# Patient Record
Sex: FEMALE | Race: WHITE | NOT HISPANIC OR LATINO | Employment: FULL TIME | URBAN - METROPOLITAN AREA
[De-identification: names, ages, dates, MRNs, and addresses within clinical notes are randomized per-mention and may not be internally consistent; named-entity substitution may affect disease eponyms.]

---

## 2017-01-03 ENCOUNTER — GENERIC CONVERSION - ENCOUNTER (OUTPATIENT)
Dept: OTHER | Facility: OTHER | Age: 47
End: 2017-01-03

## 2017-01-06 ENCOUNTER — GENERIC CONVERSION - ENCOUNTER (OUTPATIENT)
Dept: OTHER | Facility: OTHER | Age: 47
End: 2017-01-06

## 2017-01-14 ENCOUNTER — HOSPITAL ENCOUNTER (OUTPATIENT)
Dept: MAMMOGRAPHY | Facility: MEDICAL CENTER | Age: 47
Discharge: HOME | End: 2017-01-14
Payer: COMMERCIAL

## 2017-01-14 DIAGNOSIS — Z12.31 ENCOUNTER FOR SCREENING MAMMOGRAM FOR MALIGNANT NEOPLASM OF BREAST: ICD-10-CM

## 2017-01-14 PROCEDURE — 77063 BREAST TOMOSYNTHESIS BI: CPT

## 2017-01-14 PROCEDURE — G0202 SCR MAMMO BI INCL CAD: HCPCS

## 2017-10-20 ENCOUNTER — ALLSCRIPTS OFFICE VISIT (OUTPATIENT)
Dept: OTHER | Facility: OTHER | Age: 47
End: 2017-10-20

## 2017-10-20 DIAGNOSIS — Z12.31 ENCOUNTER FOR SCREENING MAMMOGRAM FOR MALIGNANT NEOPLASM OF BREAST: ICD-10-CM

## 2018-01-15 ENCOUNTER — HOSPITAL ENCOUNTER (OUTPATIENT)
Dept: MAMMOGRAPHY | Facility: MEDICAL CENTER | Age: 48
Discharge: HOME | End: 2018-01-15
Payer: COMMERCIAL

## 2018-01-15 DIAGNOSIS — Z12.31 ENCOUNTER FOR SCREENING MAMMOGRAM FOR MALIGNANT NEOPLASM OF BREAST: ICD-10-CM

## 2018-01-15 PROCEDURE — 77067 SCR MAMMO BI INCL CAD: CPT

## 2018-01-15 PROCEDURE — 77063 BREAST TOMOSYNTHESIS BI: CPT

## 2018-01-15 NOTE — RESULT NOTES
Verified Results  (1) URINALYSIS w URINE C/S REFLEX (will reflex a microscopy if leukocytes, occult blood, or nitrites are not within normal limits) [id number] 06:26PV Ramses Neely     Test Name Result Flag Reference   COLOR Yellow     CLARITY Clear     PH UA 7 5  4 5-8 0   LEUKOCYTE ESTERASE UA Negative  Negative   NITRITE UA Negative  Negative   PROTEIN UA Negative mg/dl  Negative   GLUCOSE UA Negative mg/dl  Negative   KETONES UA Negative mg/dl  Negative   UROBILINOGEN UA 0 2 E U /dl  0 2, 1 0 E U /dl   BILIRUBIN UA Negative  Negative   BLOOD UA Negative  Negative   SPECIFIC GRAVITY UA 1 010  1 003-1 030     (1) CHLAMYDIA/GC AMPLIFIED DNA, PCR [id number] 84:26KE Ramses Neely     Test Name Result Flag Reference   CHLAMYDIA,AMPLIFIED DNA PROBE   C  trachomatis Amplified DNA Negative   C  trachomatis Amplified DNA Negative   For optimal microbe detection, urine samples should be a first catch specimen (20-60 ml of urine)  Patient should not have urinated for at least 1 hour prior to collection  A specimen not collected in this manner may have falsely negative results  N  GONORRHOEAE AMPLIFIED DNA   N  gonorrhoeae Amplified DNA Negative   N  gonorrhoeae Amplified DNA Negative     * US PELVIS COMPLETE [facility] AND TRANSVAGINAL) [id number] 41:75HB Donaldo Mehta Order Number: [id number]    - Patient Instructions: To schedule this appointment, please contact Central Scheduling at [phone]  Test Name Result Flag Reference   US PELVIS COMPLETE (TRANSABDOMINAL AND TRANSVAGINAL) (Report)     PELVIC ULTRASOUND, COMPLETE     INDICATION: Pelvic pain and urinary frequency for 2 months LMP was 8/23/2016      COMPARISON: None  TECHNIQUE:  Transabdominal pelvic ultrasound was performed in sagittal and transverse planes with a curvilinear transducer  Additional transvaginal imaging was performed to better evaluate the endometrium and ovaries   Imaging included volumetric    sweeps as well as traditional still imaging technique  FINDINGS:     UTERUS:   The uterus is anteverted in position, measuring 5 0 x 6 1 x 12 1 cm  The uterus is heterogeneous  The cervix shows no suspicious abnormality  ENDOMETRIUM:    Normal caliber of 8 mm  Homogenous and normal in appearance  OVARIES/ADNEXA:   Right ovary: 1 5 x 1 3 x 2 5 cm  No suspicious right ovarian abnormality  Doppler flow within normal limits  Left ovary: 1 8 x 3 3 x 4 4 cm  There is a 2 cm cyst in the left ovary with increased echogenicity along the wall, an indeterminate feature  No internal vascularity is identified  Doppler flow within normal limits  No suspicious adnexal mass or loculated collections  There is no free fluid  IMPRESSION:      1  Heterogeneous uterus  This is nonspecific though can be seen with adenomyosis  2  Complex left ovarian cyst with indeterminate features though no internal vascularity  A follow-up ultrasound in 6-12 weeks is recommended to ensure resolution        ##sigslh##sigslh     ##fuslh3##fuslh3       Workstation performed: [id number]     Signed by:   Martha Holland MD   9/6/16

## 2018-01-15 NOTE — RESULT NOTES
Verified Results  * [facility] [id number] 49:85ZF Chio Manriquez Order Number: [id number]   Performing Comments: This is a follow up US to recheck left ovarain cyst   - Patient Instructions: To schedule this appointment, please contact Central Scheduling at [phone]  Test Name Result Flag Reference   US PELVIS COMPLETE (TRANSABDOMINAL AND TRANSVAGINAL) (Report)     PELVIC ULTRASOUND, COMPLETE     INDICATION: Follow-up ovarian cyst           COMPARISON: Pelvic ultrasound 9/2/2016     TECHNIQUE:  Transabdominal pelvic ultrasound was performed in sagittal and transverse planes with a curvilinear transducer  Additional transvaginal imaging was performed to better evaluate the endometrium and ovaries  Imaging included volumetric    sweeps as well as traditional still imaging technique  FINDINGS:     UTERUS:   The uterus is anteverted in position, measuring 9 4 x 4 5 x 5 4 cm  Contour appears normal  1 4 x 0 9 x 1 1 cm posterior fundal fibroid  The cervix shows no suspicious abnormality  ENDOMETRIUM:    Normal caliber of 4 mm  Homogenous and normal in appearance  OVARIES/ADNEXA:   Right ovary: 2 6 x 0 8 x 1 2 cm  No suspicious right ovarian abnormality  Doppler flow within normal limits  Left ovary: 2 3 x 2 1 x 2 0 cm  No suspicious left ovarian abnormality  Complex cyst seen previously has resolved  There is a 1 3 x 1 1 x 1 2 cm simple appearing follicular cyst    Doppler flow within normal limits  No suspicious adnexal mass or loculated collections  There is no free fluid  IMPRESSION:      Resolution of complex left ovarian cyst  1 3 cm simple appearing left ovarian follicle  Small fundal fibroid            Workstation performed: [id number]     Signed by:   mAy Lucero DO   12/30/16

## 2018-01-18 NOTE — RESULT NOTES
Verified Results  (1) THIN PREP PAP WITH IMAGING [id number] 12:76ZW Liz Hirsch Order Number: [id number]     Test Name Result Flag Reference   LAB AP CASE REPORT (Report)     Gynecologic Cytology Report            Case: [id number]                  Authorizing Provider: JAIME Saavedra    Collected:      09/29/2016 1556        First Screen:     JEANNE Talamantes    Received:      10/03/2016 1007        Specimen:  LIQUID-BASED PAP, SCREENING, Cervix, Endocervical   HPV HIGH RISK RESULT (Report)     HPV, High Risk: HPV NEG, HPV16 NEG, HPV18 NEG      Other High Risk HPV Negative, HPV 16 Negative, HPV 18 Negative  HPV types: 16,18,31,33,35,39,45,51,52,56,58,59,66 and 68 DNA are undetectable or below the pre-set threshold  Roche?s FDA approved Whit 4800 is utilized with strict adherence to the ?s instruction  manual to test for the presence of High-Risk HPV DNA, as well as HPV 16 and HPV 18  This instrument  has been validated by our laboratory and/or by the   A negative result does not preclude the presence of HPV infection because results depend on adequate  specimen collection, absence of inhibitors and sufficient DNA to be detected  Additionally, HPV negative  results are not intended to prevent women from proceeding to colposcopy if clinically warranted  Positive HPV test results indicate the presence of any one or more of the high risk types, but since patients  are often co-infected with low-risk types it does not rule out the presence of low-risk types in patients  with mixed infections  LAB AP GYN PRIMARY INTERPRETATION      Negative for intraepithelial lesion or malignancy  Electronically signed by JEANNE Talamantes on 10/5/2016 at 3:00 PM   LAB AP GYN SPECIMEN ADEQUACY      Satisfactory for evaluation  Endocervical/transformation zone component present     LAB AP GYN ADDITIONAL INFORMATION (Report)     Hologic's FDA approved ,  and ThinPrep Imaging System are   utilized with strict adherence to the 's instruction manual to   prepare gynecologic and non-gynecologic cytology specimens for the   production of ThinPrep slides as well as for gynecologic ThinPrep imaging  These processes have been validated by our laboratory and/or by the     The Pap test is not a diagnostic procedure and should not be used as the   sole means to detect cervical cancer  It is only a screening procedure to   aid in the detection of cervical cancer and its precursors  Both   false-negative and false-positive results have been experienced  Your   patient's test result should be interpreted in this context together with   the history and clinical findings  LAB AP LMP 9/17/2016     Performing Comments: CERVICAL - ENDOCERVICAL   ROUTINE PAP TEST  CO-TEST REGARDLESS, TYPE 16 & 18

## 2018-01-22 VITALS
WEIGHT: 140 LBS | SYSTOLIC BLOOD PRESSURE: 116 MMHG | BODY MASS INDEX: 22.5 KG/M2 | DIASTOLIC BLOOD PRESSURE: 70 MMHG | HEIGHT: 66 IN

## 2018-09-25 ENCOUNTER — DOCUMENTATION (OUTPATIENT)
Dept: GYNECOLOGY | Facility: CLINIC | Age: 48
End: 2018-09-25

## 2018-09-25 NOTE — PROGRESS NOTES
Pt called got her period 9/2 it was light lasted 1 day then stopped got period again on 9/7 bleeding since then using 1-2 tampons daily not heavy no cramps    Spoke to Dr Carson Mensah he advised TVS/SIS appt made 10/17

## 2018-10-30 ENCOUNTER — ANNUAL EXAM (OUTPATIENT)
Dept: GYNECOLOGY | Facility: CLINIC | Age: 48
End: 2018-10-30
Payer: COMMERCIAL

## 2018-10-30 VITALS
WEIGHT: 141.4 LBS | DIASTOLIC BLOOD PRESSURE: 70 MMHG | BODY MASS INDEX: 22.73 KG/M2 | SYSTOLIC BLOOD PRESSURE: 112 MMHG | HEIGHT: 66 IN

## 2018-10-30 DIAGNOSIS — Z12.31 ENCOUNTER FOR SCREENING MAMMOGRAM FOR MALIGNANT NEOPLASM OF BREAST: ICD-10-CM

## 2018-10-30 DIAGNOSIS — Z12.4 ENCOUNTER FOR PAPANICOLAOU SMEAR FOR CERVICAL CANCER SCREENING: ICD-10-CM

## 2018-10-30 DIAGNOSIS — Z01.419 ENCOUNTER FOR ROUTINE GYNECOLOGICAL EXAMINATION WITH PAPANICOLAOU SMEAR OF CERVIX: Primary | ICD-10-CM

## 2018-10-30 PROCEDURE — G0145 SCR C/V CYTO,THINLAYER,RESCR: HCPCS | Performed by: NURSE PRACTITIONER

## 2018-10-30 PROCEDURE — S0612 ANNUAL GYNECOLOGICAL EXAMINA: HCPCS | Performed by: NURSE PRACTITIONER

## 2018-10-30 RX ORDER — ALBUTEROL SULFATE 90 UG/1
AEROSOL, METERED RESPIRATORY (INHALATION)
COMMUNITY
Start: 2010-02-09 | End: 2020-03-30 | Stop reason: SDUPTHER

## 2018-10-30 RX ORDER — CETIRIZINE HYDROCHLORIDE 10 MG/1
10 TABLET ORAL
COMMUNITY
Start: 2011-05-27

## 2018-10-30 RX ORDER — BUDESONIDE AND FORMOTEROL FUMARATE DIHYDRATE 160; 4.5 UG/1; UG/1
AEROSOL RESPIRATORY (INHALATION)
COMMUNITY
End: 2020-03-30 | Stop reason: SDUPTHER

## 2018-10-30 RX ORDER — LORATADINE 10 MG/1
CAPSULE, LIQUID FILLED ORAL AS NEEDED
COMMUNITY

## 2018-10-30 RX ORDER — OLOPATADINE HYDROCHLORIDE 2 MG/ML
SOLUTION/ DROPS OPHTHALMIC
COMMUNITY
Start: 2018-10-03 | End: 2021-06-24 | Stop reason: SDUPTHER

## 2018-10-30 NOTE — PROGRESS NOTES
Subjective      Chilango Mcgee is a 49 y o  female who presents for her annual exam  Periods are regular every 28-30 days, lasting 5 days  Dysmenorrhea:none  Cyclic symptoms include none  No intermenstrual bleeding, spotting, or discharge  She relates that she had one episode of irregular bleeding in September which did last longer than usual  She is scheduled for further evaluation but prefers to observe at this time  Current contraception: vasectomy  History of abnormal Pap smear: no  Family history of breast cancer: yes - M aunt ( after age 48)  Past Medical History:   Diagnosis Date    Female pelvic pain      Family History   Problem Relation Age of Onset    Breast cancer Maternal Aunt      Past Surgical History:   Procedure Laterality Date    COLONOSCOPY       Social History     Social History    Marital status: /Civil Union     Spouse name: N/A    Number of children: N/A    Years of education: N/A     Occupational History    Not on file       Social History Main Topics    Smoking status: Current Some Day Smoker    Smokeless tobacco: Never Used    Alcohol use Yes      Comment: 1-2 drinks per week    Drug use: No    Sexual activity: Yes     Partners: Male     Birth control/ protection: Male Sterilization     Other Topics Concern    Not on file     Social History Narrative    No narrative on file       Current Outpatient Prescriptions:     albuterol (PROVENTIL HFA,VENTOLIN HFA) 90 mcg/act inhaler, 2 inhalations by mouth every 4-6 hours prn wheezing , Disp: , Rfl:     Azelastine HCl 0 15 % SOLN, 0 15 % into each nostril, Disp: , Rfl:     cetirizine (ZyrTEC) 10 mg tablet, Take 10 mg by mouth every 24 hours, Disp: , Rfl:     Loratadine 10 MG CAPS, Take by mouth, Disp: , Rfl:     budesonide-formoterol (SYMBICORT) 160-4 5 mcg/act inhaler, Inhale, Disp: , Rfl:     olopatadine HCl (PATADAY) 0 2 % opth drops, , Disp: , Rfl:       Review of Systems  Constitutional :no fever, feels well, no tiredness, no recent weight gain or loss  Cardiovascular: no complaints of slow or fast heart beat, no chest pain, no palpitations  Respiratory: no complaints of shortness of shortness of breath, no SINGLETON  Breasts:no complaints of breast pain, breast lump, or nipple discharge  Gastrointestinal: no complaints of abdominal pain, constipation,nausea, vomiting, or diarrhea or bloody stools  Genitourinary : no complaints of dysuria, incontinence, pelvic pain, dysmenorrhea,vaginal discharge or abnormal vaginal bleeding  Integumentary: no complaints of skin rash or lesion,itching or dry skin  Neurological: no complaints of headache,numbness, tingling, dizziness or fainting       Objective      /70 (BP Location: Right arm)   Ht 5' 5 5" (1 664 m)   Wt 64 1 kg (141 lb 6 4 oz)   LMP 10/17/2018   BMI 23 17 kg/m²     General:   appears stated age","cooperative","alert" normal mood and affect   Neck: Neck: normal, supple,trachea midline, no masses   Heart: regular rate and rhythm, S1, S2 normal, no murmur, click, rub or gallop   Lungs: clear to auscultation bilaterally   Breasts: Breast exam :normal, no dimpling or skin changes noted   Abdomen: soft, non-tender, without masses or organomegaly   Vulva: Normal , no lesions   Vagina: normal , no lesions or dryness   Urethra: normal   Cervix: Normal, no palpable masses A pap smear with reflex was done/pt  request   Uterus: Normal , non-tender,not enlarged,no palpable masses   Adnexa: Normal, non-tender without fullness or masses                         Assessment     Normal GYN exam       Plan      All questions answered  Await pap smear results  Breast self exam technique reviewed and patient encouraged to perform self-exam monthly  Dietary diary  Follow up as needed  Mammogram   Thin prep Pap smear  We discussed her longer than usual menses in September and possible causes  Since her Oct  menses was normal she prefers to observe at this time   Should this recur she [facility] and follow through with a work up  She did have a normal colonoscopy done this year and will be due for a repeat in 5 yrs

## 2018-11-02 LAB
LAB AP GYN PRIMARY INTERPRETATION: NORMAL
Lab: NORMAL

## 2019-01-18 ENCOUNTER — HOSPITAL ENCOUNTER (OUTPATIENT)
Dept: MAMMOGRAPHY | Facility: MEDICAL CENTER | Age: 49
Discharge: HOME | End: 2019-01-18
Payer: COMMERCIAL

## 2019-01-18 VITALS — BODY MASS INDEX: 21.69 KG/M2 | HEIGHT: 66 IN | WEIGHT: 135 LBS

## 2019-01-18 DIAGNOSIS — Z12.31 ENCOUNTER FOR SCREENING MAMMOGRAM FOR MALIGNANT NEOPLASM OF BREAST: ICD-10-CM

## 2019-01-18 PROCEDURE — 77067 SCR MAMMO BI INCL CAD: CPT

## 2019-01-18 PROCEDURE — 77063 BREAST TOMOSYNTHESIS BI: CPT

## 2019-04-02 ENCOUNTER — CONSULT (OUTPATIENT)
Dept: SURGICAL ONCOLOGY | Facility: CLINIC | Age: 49
End: 2019-04-02
Payer: COMMERCIAL

## 2019-04-02 VITALS
SYSTOLIC BLOOD PRESSURE: 100 MMHG | BODY MASS INDEX: 23.4 KG/M2 | TEMPERATURE: 98 F | HEIGHT: 66 IN | WEIGHT: 145.6 LBS | DIASTOLIC BLOOD PRESSURE: 60 MMHG | HEART RATE: 74 BPM | RESPIRATION RATE: 14 BRPM

## 2019-04-02 DIAGNOSIS — Z91.89 INCREASED RISK OF BREAST CANCER: ICD-10-CM

## 2019-04-02 DIAGNOSIS — R92.2 DENSE BREAST TISSUE: Primary | ICD-10-CM

## 2019-04-02 DIAGNOSIS — Z12.39 BREAST CANCER SCREENING OTHER THAN MAMMOGRAM: ICD-10-CM

## 2019-04-02 DIAGNOSIS — Z80.3 FAMILY HISTORY OF BREAST CANCER: ICD-10-CM

## 2019-04-02 DIAGNOSIS — Z12.31 SCREENING MAMMOGRAM FOR HIGH-RISK PATIENT: ICD-10-CM

## 2019-04-02 PROBLEM — R92.30 DENSE BREAST TISSUE: Status: ACTIVE | Noted: 2019-04-02

## 2019-04-02 PROCEDURE — 99243 OFF/OP CNSLTJ NEW/EST LOW 30: CPT | Performed by: SURGERY

## 2019-04-02 RX ORDER — SERTRALINE HYDROCHLORIDE 25 MG/1
25 TABLET, FILM COATED ORAL
COMMUNITY
Start: 2019-03-29 | End: 2021-11-04 | Stop reason: ALTCHOICE

## 2019-04-02 RX ORDER — ALPRAZOLAM 0.25 MG/1
TABLET ORAL
COMMUNITY
Start: 2019-01-31

## 2019-04-12 ENCOUNTER — DOCUMENTATION (OUTPATIENT)
Dept: HEMATOLOGY ONCOLOGY | Facility: CLINIC | Age: 49
End: 2019-04-12

## 2019-06-10 ENCOUNTER — DOCUMENTATION (OUTPATIENT)
Dept: GYNECOLOGY | Facility: CLINIC | Age: 49
End: 2019-06-10

## 2019-07-10 ENCOUNTER — ULTRASOUND (OUTPATIENT)
Dept: GYNECOLOGY | Facility: CLINIC | Age: 49
End: 2019-07-10
Payer: COMMERCIAL

## 2019-07-10 ENCOUNTER — OFFICE VISIT (OUTPATIENT)
Dept: GYNECOLOGY | Facility: CLINIC | Age: 49
End: 2019-07-10
Payer: COMMERCIAL

## 2019-07-10 DIAGNOSIS — N93.9 ABNORMAL UTERINE BLEEDING (AUB): Primary | ICD-10-CM

## 2019-07-10 PROCEDURE — 58340 CATHETER FOR HYSTEROGRAPHY: CPT | Performed by: OBSTETRICS & GYNECOLOGY

## 2019-07-10 PROCEDURE — 76830 TRANSVAGINAL US NON-OB: CPT | Performed by: OBSTETRICS & GYNECOLOGY

## 2019-07-10 PROCEDURE — 88305 TISSUE EXAM BY PATHOLOGIST: CPT | Performed by: PATHOLOGY

## 2019-07-10 PROCEDURE — 76831 ECHO EXAM UTERUS: CPT | Performed by: OBSTETRICS & GYNECOLOGY

## 2019-07-10 PROCEDURE — 58100 BIOPSY OF UTERUS LINING: CPT | Performed by: OBSTETRICS & GYNECOLOGY

## 2019-07-10 RX ORDER — MEDROXYPROGESTERONE ACETATE 10 MG/1
10 TABLET ORAL DAILY
Qty: 10 TABLET | Refills: 0 | Status: SHIPPED | OUTPATIENT
Start: 2019-07-10 | End: 2021-11-04 | Stop reason: ALTCHOICE

## 2019-07-10 NOTE — PROGRESS NOTES
Patient presents to the office today for transvaginal scan possible endometrial biopsy possible SIS secondary to abnormal uterine bleeding her menses but have been fairly regular up until the past month or 2  She has been having episodes of bleeding every week to 2  TVS/SIS        Abnormal uterine bleeding (AUB) [N93 9]     Show:Clear all  [x]Manual[x]Template[]Copied    Added by:  [x]Lucia Zheng    []López for details  AMB US Pelvic Non OB  Date/Time: 7/10/2019 10:54 AM  Performed by: Micah Ren  Authorized by: Annamaria Brower DO      Procedure details:     Indications: non-obstetric vaginal bleeding      Technique:  Transvaginal US, Non-OB    Position: lithotomy exam    Uterine findings:     Length (cm): 9 58    Height (cm):  5 3    Width (cm):  6 46    Endometrial stripe: identified      Endometrium thickness (mm):  15 4  Left ovary findings:     Left ovary:  Visualized    Length (cm): 3 94    Height (cm): 1 58    Width (cm): 2 19  Right ovary findings:     Right ovary:  Visualized    Length (cm): 4 03    Height (cm): 2 39    Width (cm): 3 24  Other findings:     Free pelvic fluid: identified    Post-Procedure Details:     Impression:  Anteverted uterus demonstrates multiple small fibroids  Largest are posterior subserosal 2 0cm, anterior intramural 1 3cm, and posterior intramural 1 1cm  The bilateral ovaries appear within normal limits  The right ovary contains a 9 2FR follicle  There is a small amount of free fluid noted within the cul de sac  Tolerance:   Tolerated well, no immediate complications    Complications: no complications    Additional Procedure Comments:      [employer] P5 transvaginal transducer E8C with Serial Number [id number] was used during procedure and subsequently cleaned with high level disinfection utilizing the [employer]       Patient's partner has had a vasectomy as her form of birth control       Compare to 12/30/2016       Ultrasound performed at:    [facility] [address]  [address]  [address] [place] [address] Rd, [address]  Phone: [phone]  Fax:  [phone]     Sonohysterogram  Date/Time: 7/10/2019 10:57 AM  Performed by: Kim Mtz DO  Authorized by: Kim Mtz DO      Consent:     Consent obtained:  Verbal and written    Consent given by:  Patient    Patient questions answered: yes      Patient agrees, verbalizes understanding, and wants to proceed: yes      Instructions and paperwork completed: yes    Pre-procedure:     Pre-procedure timeout performed: yes      Prepped with: Betadine    Procedure:     Cervix cleaned and prepped: yes      Catheter inserted: yes      Uterine cavity distended with saline: yes    Post-procedure:     Patient observed: yes      Post procedure instructions given to patient: yes      Patient tolerated procedure well with no complications: yes    Comments:      Sonohysterogram demonstrates a smooth appearing endometrium  Endometrial biopsy  Date/Time: 7/10/2019 10:58 AM  Performed by: Kim Mtz DO  Authorized by: Kim Mtz DO      Consent:     Consent obtained:  Verbal and written    Consent given by:  Patient    Patient questions answered: yes      Patient agrees, verbalizes understanding, and wants to proceed: yes      Instructions and paperwork completed: yes    Indication:     Indications: Other disorder of menstruation and other abnormal bleeding from female genital tract    Pre-procedure:     Pre-procedure timeout performed: yes    Procedure:     Procedure: endometrial biopsy with Pipelle      A bivalve speculum was placed in the vagina: yes      Cervix cleaned and prepped: yes      Specimen collected: specimen collected and sent to pathology      Patient tolerated procedure well with no complications: yes             Findings reviewed with the patient  Plan is Provera 10 mg daily for 10 days    If AUB persists she will return to the office

## 2019-07-10 NOTE — PROGRESS NOTES
AMB US Pelvic Non OB  Date/Time: 7/10/2019 10:54 AM  Performed by: Seth Sanchez  Authorized by: Angelo Baltazar DO     Procedure details:     Indications: non-obstetric vaginal bleeding      Technique:  Transvaginal US, Non-OB    Position: lithotomy exam    Uterine findings:     Length (cm): 9 58    Height (cm):  5 3    Width (cm):  6 46    Endometrial stripe: identified      Endometrium thickness (mm):  15 4  Left ovary findings:     Left ovary:  Visualized    Length (cm): 3 94    Height (cm): 1 58    Width (cm): 2 19  Right ovary findings:     Right ovary:  Visualized    Length (cm): 4 03    Height (cm): 2 39    Width (cm): 3 24  Other findings:     Free pelvic fluid: identified    Post-Procedure Details:     Impression:  Anteverted uterus demonstrates multiple small fibroids  Largest are posterior subserosal 2 0cm, anterior intramural 1 3cm, and posterior intramural 1 1cm  The bilateral ovaries appear within normal limits  The right ovary contains a 5 9EG follicle  There is a small amount of free fluid noted within the cul de sac  Tolerance: Tolerated well, no immediate complications    Complications: no complications    Additional Procedure Comments:      [employer] P5 transvaginal transducer E8C with Serial Number [id number] was used during procedure and subsequently cleaned with high level disinfection utilizing the [employer]  Patient's partner has had a vasectomy as her form of birth control  Compare to 12/30/2016       Ultrasound performed at:     [address] for [facility] [address]  [address]  [address] [facility]  Phone: [phone]  Fax:  [phone]    Sonohysterogram  Date/Time: 7/10/2019 10:57 AM  Performed by: Angelo Baltazar DO  Authorized by: Angelo Baltazar DO     Consent:     Consent obtained:  Verbal and written    Consent given by:  Patient    Patient questions answered: yes      Patient agrees, verbalizes understanding, and wants to proceed: yes      Instructions and paperwork completed: yes    Pre-procedure:     Pre-procedure timeout performed: yes      Prepped with: Betadine    Procedure:     Cervix cleaned and prepped: yes      Catheter inserted: yes      Uterine cavity distended with saline: yes    Post-procedure:     Patient observed: yes      Post procedure instructions given to patient: yes      Patient tolerated procedure well with no complications: yes    Comments:      Sonohysterogram demonstrates a smooth appearing endometrium  Endometrial biopsy  Date/Time: 7/10/2019 10:58 AM  Performed by: Marlen Buchanan DO  Authorized by: Marlen Buchanan DO     Consent:     Consent obtained:  Verbal and written    Consent given by:  Patient    Patient questions answered: yes      Patient agrees, verbalizes understanding, and wants to proceed: yes      Instructions and paperwork completed: yes    Indication:     Indications:  Other disorder of menstruation and other abnormal bleeding from female genital tract    Pre-procedure:     Pre-procedure timeout performed: yes    Procedure:     Procedure: endometrial biopsy with Pipelle      A bivalve speculum was placed in the vagina: yes      Cervix cleaned and prepped: yes      Specimen collected: specimen collected and sent to pathology      Patient tolerated procedure well with no complications: yes

## 2019-07-15 ENCOUNTER — TELEPHONE (OUTPATIENT)
Dept: GYNECOLOGY | Facility: CLINIC | Age: 49
End: 2019-07-15

## 2019-07-15 DIAGNOSIS — N76.0 VAGINITIS AND VULVOVAGINITIS: Primary | ICD-10-CM

## 2019-07-15 RX ORDER — FLUCONAZOLE 150 MG/1
150 TABLET ORAL ONCE
Qty: 1 TABLET | Refills: 0 | Status: SHIPPED | OUTPATIENT
Start: 2019-07-15 | End: 2019-07-15

## 2019-07-15 NOTE — TELEPHONE ENCOUNTER
Patient called and is getting a yeast infection, she suspects, from her ultra sound  She would like the pill for this (Diflucan??) sent to [facility]

## 2019-07-19 ENCOUNTER — HOSPITAL ENCOUNTER (OUTPATIENT)
Dept: ULTRASOUND IMAGING | Facility: CLINIC | Age: 49
Discharge: HOME | End: 2019-07-19
Payer: COMMERCIAL

## 2019-07-19 VITALS — BODY MASS INDEX: 22.5 KG/M2 | HEIGHT: 66 IN | WEIGHT: 140 LBS

## 2019-07-19 DIAGNOSIS — R92.2 DENSE BREAST TISSUE: ICD-10-CM

## 2019-07-19 DIAGNOSIS — Z12.39 BREAST CANCER SCREENING OTHER THAN MAMMOGRAM: ICD-10-CM

## 2019-07-19 PROCEDURE — 76641 ULTRASOUND BREAST COMPLETE: CPT

## 2019-07-19 PROCEDURE — 76377 3D RENDER W/INTRP POSTPROCES: CPT

## 2019-10-03 DIAGNOSIS — Z23 NEED FOR INFLUENZA VACCINATION: Primary | ICD-10-CM

## 2019-10-08 ENCOUNTER — OFFICE VISIT (OUTPATIENT)
Dept: URGENT CARE | Facility: CLINIC | Age: 49
End: 2019-10-08

## 2019-10-08 VITALS
TEMPERATURE: 95.9 F | RESPIRATION RATE: 18 BRPM | OXYGEN SATURATION: 98 % | HEART RATE: 73 BPM | DIASTOLIC BLOOD PRESSURE: 68 MMHG | SYSTOLIC BLOOD PRESSURE: 104 MMHG

## 2019-10-08 DIAGNOSIS — R05.9 COUGH: ICD-10-CM

## 2019-10-08 DIAGNOSIS — J01.00 ACUTE NON-RECURRENT MAXILLARY SINUSITIS: Primary | ICD-10-CM

## 2019-10-08 RX ORDER — AZITHROMYCIN 250 MG/1
TABLET, FILM COATED ORAL
Qty: 6 TABLET | Refills: 0 | Status: SHIPPED | OUTPATIENT
Start: 2019-10-08 | End: 2019-10-12

## 2019-10-08 RX ORDER — BROMPHENIRAMINE MALEATE, PSEUDOEPHEDRINE HYDROCHLORIDE, AND DEXTROMETHORPHAN HYDROBROMIDE 2; 30; 10 MG/5ML; MG/5ML; MG/5ML
10 SYRUP ORAL 4 TIMES DAILY PRN
Qty: 120 ML | Refills: 0 | Status: SHIPPED | OUTPATIENT
Start: 2019-10-08 | End: 2021-11-04 | Stop reason: ALTCHOICE

## 2019-10-08 NOTE — PROGRESS NOTES
[facility]        NAME: Alida Nunes is a 51 y o  female  : 1970    MRN: [id number]  DATE: 2019  TIME: 12:27 PM    Assessment and Plan   Acute non-recurrent maxillary sinusitis [J01 00]  1  Acute non-recurrent maxillary sinusitis  azithromycin (ZITHROMAX) 250 mg tablet   2  Cough  brompheniramine-pseudoephedrine-DM 30-2-10 MG/5ML syrup         Patient Instructions   Discussed with patient azithromycin is not ideal for sinusitis though patient persistent this is the only antibiotic which works for her  May alternate Tylenol and Ibuprofen as needed  Encourage fluids and rest    Saline nasal spray as needed  Humidify bedroom  Salt water gargles  Chloraseptic spray and lozenges as needed  Consider adding anti-histamines daily, ie  Claritin or Zyrtec  Complete course of antibiotics as directed  Follow up with PCP in 3-5 days  Proceed to  ER if symptoms worsen  Chief Complaint     Chief Complaint   Patient presents with    Cough         History of Present Illness       Patient presents in office with headache, eye pain, sore throat, bilateral ear pain, cough x  4 days  Today with productive cough  Felt febrile  Denies chills, body aches, NV/D  +asthma, has been using inhaler  Former smoker  + seasonal allergies, takes claritin daily and nasal spray prn  Saline rinse daily  No additional otc meds used  Review of Systems   Review of Systems   Constitutional: Positive for fatigue and fever  Negative for chills  HENT: Positive for congestion, ear pain, sinus pressure, sinus pain and sore throat  Negative for rhinorrhea  Respiratory: Positive for cough and chest tightness  Negative for shortness of breath and wheezing  Cardiovascular: Negative  Gastrointestinal: Negative  Musculoskeletal: Negative for myalgias  Skin: Negative for rash           Current Medications       Current Outpatient Medications:     albuterol (PROVENTIL HFA,VENTOLIN HFA) 90 mcg/act inhaler, 2 inhalations by mouth every 4-6 hours prn wheezing , Disp: , Rfl:     budesonide-formoterol (SYMBICORT) 160-4 5 mcg/act inhaler, Inhale, Disp: , Rfl:     Loratadine 10 MG CAPS, Take by mouth, Disp: , Rfl:     olopatadine HCl (PATADAY) 0 2 % opth drops, , Disp: , Rfl:     sertraline (ZOLOFT) 25 mg tablet, Take 25 mg by mouth, Disp: , Rfl:     ALPRAZolam (XANAX) 0 25 mg tablet, Take 1 tab by mouth daily as needed for anxiety, Disp: , Rfl:     Azelastine HCl 0 15 % SOLN, 0 15 % into each nostril, Disp: , Rfl:     azithromycin (ZITHROMAX) 250 mg tablet, Take 2 tablets on day one, then 1 tablet on day 2-5 , Disp: 6 tablet, Rfl: 0    brompheniramine-pseudoephedrine-DM 30-2-10 MG/5ML syrup, Take 10 mL by mouth 4 (four) times a day as needed for cough, Disp: 120 mL, Rfl: 0    cetirizine (ZyrTEC) 10 mg tablet, Take 10 mg by mouth every 24 hours, Disp: , Rfl:     medroxyPROGESTERone (PROVERA) 10 mg tablet, Take 1 tablet (10 mg total) by mouth daily for 10 days, Disp: 10 tablet, Rfl: 0    Current Allergies     Allergies as of 10/08/2019 - Reviewed 10/08/2019   Allergen Reaction Noted    Clarithromycin  09/10/2012    Methylprednisolone      Penicillins  09/10/2012    Prednisone  04/13/2015    Sulfa antibiotics  04/13/2015            The following portions of the patient's history were reviewed and updated as appropriate: allergies, current medications, past family history, past medical history, past social history, past surgical history and problem list      Past Medical History:   Diagnosis Date    Asthma     Constipation     Female pelvic pain     Headache     Hemorrhoid     Wears glasses        Past Surgical History:   Procedure Laterality Date    BUNIONECTOMY Right 1999    CARPAL TUNNEL RELEASE Bilateral     performed a year apart more than 6 years ago    COLONOSCOPY      CYSTECTOMY         Family History   Problem Relation Age of Onset    Breast cancer Maternal Aunt 54    Breast cancer Maternal Aunt 61    Lung cancer Maternal Grandmother 67    Stomach cancer Maternal Aunt 67    No Known Problems Mother     No Known Problems Father     No Known Problems Daughter     No Known Problems Maternal Grandfather     No Known Problems Paternal Grandmother     No Known Problems Paternal Grandfather     No Known Problems Daughter     No Known Problems Paternal Aunt          Medications have been verified  Objective   /68   Pulse 73   Temp (!) 95 9 °F (35 5 °C)   Resp 18   SpO2 98%        Physical Exam     Physical Exam   Constitutional: She is oriented to person, place, and time  Vital signs are normal  She appears well-developed and well-nourished  She is cooperative  Non-toxic appearance  She does not have a sickly appearance  She does not appear ill  No distress  HENT:   Head: Normocephalic and atraumatic  Right Ear: Hearing, tympanic membrane and external ear normal  Tympanic membrane is not injected  Left Ear: Hearing, tympanic membrane, external ear and ear canal normal    Nose: Mucosal edema and sinus tenderness present  Right sinus exhibits maxillary sinus tenderness  Right sinus exhibits no frontal sinus tenderness  Left sinus exhibits maxillary sinus tenderness  Left sinus exhibits no frontal sinus tenderness  Mouth/Throat: Uvula is midline, oropharynx is clear and moist and mucous membranes are normal  Normal dentition  No oropharyngeal exudate  R ear canal noted to be slightly erythematous, TM wnl  Eyes: Pupils are equal, round, and reactive to light  Conjunctivae, EOM and lids are normal  Right eye exhibits no discharge  Left eye exhibits no discharge  Neck: Trachea normal and normal range of motion  No thyroid mass and no thyromegaly present  Cardiovascular: Normal rate, regular rhythm, S1 normal, S2 normal, normal heart sounds, intact distal pulses and normal pulses  Exam reveals no gallop and no friction rub  No murmur heard    Pulmonary/Chest: Effort normal and breath sounds normal  No respiratory distress  She has no wheezes  She has no rhonchi  She has no rales  She exhibits no tenderness  Musculoskeletal: Normal range of motion  She exhibits no edema  Lymphadenopathy:     She has no cervical adenopathy  Neurological: She is alert and oriented to person, place, and time  Skin: Skin is warm and dry  No rash noted  She is not diaphoretic  Psychiatric: She has a normal mood and affect  Her behavior is normal  Thought content normal    Nursing note and vitals reviewed

## 2019-10-08 NOTE — PATIENT INSTRUCTIONS
May alternate Tylenol and Ibuprofen as needed  Encourage fluids and rest    Saline nasal spray as needed  Humidify bedroom  Salt water gargles  Chloraseptic spray and lozenges as needed  Complete course of antibiotics as directed  F/U with PCP if symptoms persist/worsen or go to nearest emergency department if any signs of distress

## 2019-11-07 ENCOUNTER — TRANSCRIBE ORDERS (OUTPATIENT)
Dept: URGENT CARE | Facility: CLINIC | Age: 49
End: 2019-11-07

## 2019-11-07 ENCOUNTER — APPOINTMENT (OUTPATIENT)
Dept: URGENT CARE | Facility: CLINIC | Age: 49
End: 2019-11-07

## 2019-11-07 VITALS
DIASTOLIC BLOOD PRESSURE: 70 MMHG | HEIGHT: 66 IN | SYSTOLIC BLOOD PRESSURE: 108 MMHG | BODY MASS INDEX: 23.3 KG/M2 | WEIGHT: 145 LBS

## 2019-11-07 DIAGNOSIS — Z13.6 SCREENING FOR CARDIOVASCULAR CONDITION: Primary | ICD-10-CM

## 2019-11-07 DIAGNOSIS — Z00.8 ENCOUNTER FOR BIOMETRIC SCREENING: ICD-10-CM

## 2019-11-07 DIAGNOSIS — Z01.89 ENCOUNTER FOR TOBACCO USE SCREENING: ICD-10-CM

## 2019-11-07 DIAGNOSIS — Z00.8 ENCOUNTER FOR BIOMETRIC SCREENING: Primary | ICD-10-CM

## 2019-11-07 LAB
CHOLEST SERPL-MCNC: 185 MG/DL
GLUCOSE P FAST SERPL-MCNC: 81 MG/DL
HDLC SERPL-MCNC: 82 MG/DL (ref 40–?)
LDLC SERPL DIRECT ASSAY-MCNC: 93 MG/DL
TRIGL SERPL-MCNC: 54 MG/DL (ref ?–150)

## 2019-11-07 PROCEDURE — G0480 DRUG TEST DEF 1-7 CLASSES: HCPCS | Performed by: NURSE PRACTITIONER

## 2019-11-07 PROCEDURE — 80323 ALKALOIDS NOS: CPT | Performed by: NURSE PRACTITIONER

## 2019-11-12 LAB
COTININE SERPL-MCNC: NORMAL NG/ML
NICOTINE SERPL-MCNC: NORMAL NG/ML

## 2020-01-20 ENCOUNTER — HOSPITAL ENCOUNTER (OUTPATIENT)
Dept: MAMMOGRAPHY | Facility: MEDICAL CENTER | Age: 50
Discharge: HOME | End: 2020-01-20
Payer: COMMERCIAL

## 2020-01-20 VITALS — BODY MASS INDEX: 23.3 KG/M2 | WEIGHT: 145 LBS | HEIGHT: 66 IN

## 2020-01-20 DIAGNOSIS — Z12.31 SCREENING MAMMOGRAM FOR HIGH-RISK PATIENT: ICD-10-CM

## 2020-01-20 PROCEDURE — 77067 SCR MAMMO BI INCL CAD: CPT

## 2020-01-20 PROCEDURE — 77063 BREAST TOMOSYNTHESIS BI: CPT

## 2020-03-10 ENCOUNTER — ANNUAL EXAM (OUTPATIENT)
Dept: OBGYN CLINIC | Facility: MEDICAL CENTER | Age: 50
End: 2020-03-10
Payer: COMMERCIAL

## 2020-03-10 VITALS — WEIGHT: 153.1 LBS | DIASTOLIC BLOOD PRESSURE: 70 MMHG | BODY MASS INDEX: 24.71 KG/M2 | SYSTOLIC BLOOD PRESSURE: 120 MMHG

## 2020-03-10 DIAGNOSIS — N95.1 PERIMENOPAUSE: ICD-10-CM

## 2020-03-10 DIAGNOSIS — Z01.419 ENCOUNTER FOR ROUTINE GYNECOLOGICAL EXAMINATION WITH PAPANICOLAOU SMEAR OF CERVIX: Primary | ICD-10-CM

## 2020-03-10 PROCEDURE — G0145 SCR C/V CYTO,THINLAYER,RESCR: HCPCS | Performed by: OBSTETRICS & GYNECOLOGY

## 2020-03-10 PROCEDURE — S0612 ANNUAL GYNECOLOGICAL EXAMINA: HCPCS | Performed by: OBSTETRICS & GYNECOLOGY

## 2020-03-10 RX ORDER — DROSPIRENONE AND ETHINYL ESTRADIOL 0.02-3(28)
1 KIT ORAL DAILY
Qty: 28 TABLET | Refills: 3 | Status: SHIPPED | OUTPATIENT
Start: 2020-03-10 | End: 2020-03-27 | Stop reason: SDUPTHER

## 2020-03-10 NOTE — PROGRESS NOTES
ASSESSMENT & PLAN: Shmuel Mason is a 47 y o  [id number] with normal gynecologic exam     1   Routine well woman exam done today  2  Pap and HPV:  The patient's last pap and hpv was   It was normal     Pap with reflex cotesting was done today  Current ASCCP Guidelines reviewed  3   Mammogram followed by Dr Cynthia Darnell  4  Colorectal cancer screening was not ordered  Done   5  The following were reviewed in today's visit: breast self exam and  screening   6  [facility]  7  GSUI - refer to UroGyn  CC:  Annual Gynecologic Examination    HPI: Shmuel Mason is a 47 y o  [id number] who presents for annual gynecologic examination  She has the following concerns:  Complains of hot flashes, night sweats, and weight gain  Also quit smoking this past year  Has been doing well with this  Reports stress urinary incontinence  Mostly with running, yelling, or coughing  Health Maintenance:    She wears her seatbelt routinely  She does perform regular monthly self breast exams  She feels safe at home  Pap: 2018  Last mammogram:   Last colonoscopy: 2018    Past Medical History:   Diagnosis Date    Asthma     Constipation     Female pelvic pain     Headache     Hemorrhoid     Wears glasses        Past Surgical History:   Procedure Laterality Date    BUNIONECTOMY Right     CARPAL TUNNEL RELEASE Bilateral     performed a year apart more than 6 years ago    COLONOSCOPY      CYSTECTOMY         Past OB/Gyn History:  OB History        4    Para   2    Term   2            AB   2    Living   2       SAB   2    TAB        Ectopic        Multiple        Live Births   2           Obstetric Comments   Menarche : 11  Hx of BCP and Depo provera              Pt does not have menstrual issues  regular   History of sexually transmitted infection: No   History of abnormal pap smears: No      Patient is currently sexually active        Family History   Problem Relation Age of Onset    Breast cancer Maternal Aunt 54    Breast cancer Maternal Aunt 61    Lung cancer Maternal Grandmother 67    Stomach cancer Maternal Aunt 67    No Known Problems Mother     No Known Problems Father     No Known Problems Daughter     No Known Problems Maternal Grandfather     No Known Problems Paternal Grandmother     No Known Problems Paternal Grandfather     No Known Problems Daughter     No Known Problems Paternal Aunt        Social History:  Social History     Socioeconomic History    Marital status: /Civil Union     Spouse name: Not on file    Number of children: Not on file    Years of education: Not on file    Highest education level: Not on file   Occupational History    Not on file   Social Needs    Financial resource strain: Not on file    Food insecurity:     Worry: Not on file     Inability: Not on file    Transportation needs:     Medical: Not on file     Non-medical: Not on file   Tobacco Use    Smoking status: Former Smoker     Last attempt to quit: 2019     Years since quittin 1    Smokeless tobacco: Never Used   Substance and Sexual Activity    Alcohol use: Yes     Frequency: 2-4 times a month     Comment: 1-2 drinks per week    Drug use: No    Sexual activity: Yes     Partners: Male     Birth control/protection: Male Sterilization   Lifestyle    Physical activity:     Days per week: Not on file     Minutes per session: Not on file    Stress: Not on file   Relationships    Social connections:     Talks on phone: Not on file     Gets together: Not on file     Attends Confucianism service: Not on file     Active member of club or organization: Not on file     Attends meetings of clubs or organizations: Not on file     Relationship status: Not on file    Intimate partner violence:     Fear of current or ex partner: Not on file     Emotionally abused: Not on file     Physically abused: Not on file     Forced sexual activity: Not on file   Other Topics Concern    Not on file   Social History Narrative    Not on file     Allergies   Allergen Reactions    Clarithromycin     Methylprednisolone     Penicillins     Prednisone      Other reaction(s): heart racing, jittery    Sulfa Antibiotics        Current Outpatient Medications:     albuterol (PROVENTIL HFA,VENTOLIN HFA) 90 mcg/act inhaler, 2 inhalations by mouth every 4-6 hours prn wheezing , Disp: , Rfl:     ALPRAZolam (XANAX) 0 25 mg tablet, Take 1 tab by mouth daily as needed for anxiety, Disp: , Rfl:     budesonide-formoterol (SYMBICORT) 160-4 5 mcg/act inhaler, Inhale, Disp: , Rfl:     cetirizine (ZyrTEC) 10 mg tablet, Take 10 mg by mouth every 24 hours, Disp: , Rfl:     Loratadine 10 MG CAPS, Take by mouth, Disp: , Rfl:     olopatadine HCl (PATADAY) 0 2 % opth drops, , Disp: , Rfl:     sertraline (ZOLOFT) 25 mg tablet, Take 25 mg by mouth, Disp: , Rfl:     Azelastine HCl 0 15 % SOLN, 0 15 % into each nostril, Disp: , Rfl:     brompheniramine-pseudoephedrine-DM 30-2-10 MG/5ML syrup, Take 10 mL by mouth 4 (four) times a day as needed for cough (Patient not taking: Reported on 3/10/2020), Disp: 120 mL, Rfl: 0    medroxyPROGESTERone (PROVERA) 10 mg tablet, Take 1 tablet (10 mg total) by mouth daily for 10 days, Disp: 10 tablet, Rfl: 0      Review of Systems  Constitutional :no fever, feels well, no tiredness, no recent weight gain or loss  ENT: no ear ache, no loss of hearing, no nosebleeds or nasal discharge, no sore throat or hoarseness  Cardiovascular: no complaints of slow or fast heart beat, no chest pain, no palpitations, no leg claudication or lower extremity edema    Respiratory: no complaints of shortness of shortness of breath, no SINGLETON  Breasts:no complaints of breast pain, breast lump, or nipple discharge  Gastrointestinal: no complaints of abdominal pain, constipation, nausea, vomiting, or diarrhea or bloody stools  Genitourinary : no complaints of dysuria, incontinence, pelvic pain, no dysmenorrhea, vaginal discharge or abnormal vaginal bleeding and as noted in HPI  Musculoskeletal: no complaints of arthralgia, no myalgia, no joint swelling or stiffness, no limb pain or swelling  Integumentary: no complaints of skin rash or lesion, itching or dry skin  Neurological: no complaints of headache, no confusion, no numbness or tingling, no dizziness or fainting    Objective      /70   Wt 69 4 kg (153 lb 1 6 oz)   LMP 02/24/2020 (Approximate)   BMI 24 71 kg/m²     General:   appears stated age, cooperative, alert normal mood and affect   Neck: normal, supple,trachea midline, no masses   Heart: regular rate and rhythm, S1, S2 normal, no murmur, click, rub or gallop   Lungs: clear to auscultation bilaterally   Breasts: normal appearance, no masses or tenderness, Inspection negative, No nipple retraction or dimpling, No nipple discharge or bleeding, No axillary or supraclavicular adenopathy, Normal to palpation without dominant masses   Abdomen: soft, non-tender, without masses or organomegaly   Vulva: normal female genitalia, Bartholin's, Urethra, Eleva normal, no lesions, normal female hair distribution   Vagina: normal vagina, no discharge, exudate, lesion, or erythema   Urethra: normal   Cervix: Normal, no discharge  PAP done  Uterus: normal size, contour, position, consistency, mobility, non-tender   Adnexa: normal adnexa and no mass, fullness, tenderness   Lymphatic palpation of lymph nodes in neck, axilla, groin and/or other locations: no lymphadenopathy or masses noted   Skin normal skin turgor and no rashes     Psychiatric orientation to person, place, and time: normal  mood and affect: normal

## 2020-03-16 LAB
LAB AP GYN PRIMARY INTERPRETATION: NORMAL
Lab: NORMAL

## 2020-03-27 DIAGNOSIS — N95.1 PERIMENOPAUSE: ICD-10-CM

## 2020-03-27 RX ORDER — DROSPIRENONE AND ETHINYL ESTRADIOL 0.02-3(28)
1 KIT ORAL DAILY
Qty: 90 TABLET | Refills: 4 | Status: SHIPPED | OUTPATIENT
Start: 2020-03-27 | End: 2021-11-04 | Stop reason: ALTCHOICE

## 2020-03-30 ENCOUNTER — OFFICE VISIT (OUTPATIENT)
Dept: URGENT CARE | Facility: CLINIC | Age: 50
End: 2020-03-30

## 2020-03-30 VITALS
TEMPERATURE: 98.9 F | SYSTOLIC BLOOD PRESSURE: 120 MMHG | OXYGEN SATURATION: 98 % | RESPIRATION RATE: 16 BRPM | HEART RATE: 74 BPM | DIASTOLIC BLOOD PRESSURE: 80 MMHG

## 2020-03-30 DIAGNOSIS — J45.20 MILD INTERMITTENT ASTHMA WITHOUT COMPLICATION: ICD-10-CM

## 2020-03-30 DIAGNOSIS — B37.3 VAGINAL CANDIDIASIS: ICD-10-CM

## 2020-03-30 DIAGNOSIS — J01.10 ACUTE NON-RECURRENT FRONTAL SINUSITIS: Primary | ICD-10-CM

## 2020-03-30 PROBLEM — B37.31 VAGINAL CANDIDIASIS: Status: ACTIVE | Noted: 2020-03-30

## 2020-03-30 PROBLEM — J45.909 ASTHMA: Status: ACTIVE | Noted: 2020-03-30

## 2020-03-30 RX ORDER — BUDESONIDE AND FORMOTEROL FUMARATE DIHYDRATE 160; 4.5 UG/1; UG/1
2 AEROSOL RESPIRATORY (INHALATION) 2 TIMES DAILY
COMMUNITY
End: 2020-03-30 | Stop reason: SDUPTHER

## 2020-03-30 RX ORDER — ALBUTEROL SULFATE 90 UG/1
2 AEROSOL, METERED RESPIRATORY (INHALATION) EVERY 6 HOURS PRN
Qty: 1 INHALER | Refills: 0 | Status: SHIPPED | OUTPATIENT
Start: 2020-03-30 | End: 2020-04-29

## 2020-03-30 RX ORDER — DOXYCYCLINE 100 MG/1
100 TABLET ORAL 2 TIMES DAILY
Qty: 14 TABLET | Refills: 0 | Status: SHIPPED | OUTPATIENT
Start: 2020-03-30 | End: 2020-04-06

## 2020-03-30 RX ORDER — AZELASTINE 1 MG/ML
1 SPRAY, METERED NASAL DAILY
Qty: 1 BOTTLE | Refills: 0 | Status: SHIPPED | OUTPATIENT
Start: 2020-03-30 | End: 2022-04-25 | Stop reason: SDUPTHER

## 2020-03-30 RX ORDER — AZELASTINE 1 MG/ML
1 SPRAY, METERED NASAL DAILY
COMMUNITY
Start: 2020-01-29 | End: 2020-03-30 | Stop reason: SDUPTHER

## 2020-03-30 RX ORDER — FLUCONAZOLE 150 MG/1
150 TABLET ORAL ONCE
Qty: 1 TABLET | Refills: 0 | Status: SHIPPED | OUTPATIENT
Start: 2020-03-30 | End: 2020-03-30

## 2020-03-30 NOTE — PROGRESS NOTES
[facility]        NAME: Jaun Choi is a 47 y o  female  : 1970    MRN: [id number]  DATE: 2020  TIME: 2:41 PM    Assessment and Plan   Acute non-recurrent frontal sinusitis [J01 10]  1  Acute non-recurrent frontal sinusitis  doxycycline (ADOXA) 100 MG tablet    azelastine (ASTELIN) 0 1 % nasal spray   2  Vaginal candidiasis  fluconazole (DIFLUCAN) 150 mg tablet   3  Mild intermittent asthma without complication  albuterol (PROVENTIL HFA,VENTOLIN HFA) 90 mcg/act inhaler         Patient Instructions       Take Doxycycline as prescribed, take with food  Wear sunscreen when outside  Continue Astelin nasal spray daily  Increase your water intake and use nasal saline rinses  Recommend Mucinex as needed  Take Diflucan if symptoms of a yeast infection occur  Use Albuterol inhaler if needed  Follow up with your PCP or return to the clinic if symptoms worsen or persist more than 3-5 days  Proceed to  ER if symptoms worsen  Chief Complaint     Chief Complaint   Patient presents with    Sinusitis         History of Present Illness       Pressure behind eyes, pressure/pain in ears x 1 week  Post-nasal drip & cough present, mild dyspnea when jogging outside since outdoor allergens increased, about 2-3 weeks  No fever or chills  She tried taking Tylenol, saline nasal spray, and allergy medication- has not helped  This feels like sinus infections she has had in the past, hx sinusitis when her seasonal allergies flare this time of year         Review of Systems   Review of Systems      Current Medications       Current Outpatient Medications:     albuterol (PROVENTIL HFA,VENTOLIN HFA) 90 mcg/act inhaler, Inhale 2 puffs every 6 (six) hours as needed for wheezing, Disp: 1 Inhaler, Rfl: 0    ALPRAZolam (XANAX) 0 25 mg tablet, Take 1 tab by mouth daily as needed for anxiety, Disp: , Rfl:     cetirizine (ZyrTEC) 10 mg tablet, Take 10 mg by mouth every 24 hours, Disp: , Rfl:    drospirenone-ethinyl estradiol (WILFREDO) 3-0 02 MG per tablet, Take 1 tablet by mouth daily, Disp: 90 tablet, Rfl: 4    Loratadine 10 MG CAPS, Take by mouth, Disp: , Rfl:     olopatadine HCl (PATADAY) 0 2 % opth drops, , Disp: , Rfl:     azelastine (ASTELIN) 0 1 % nasal spray, 1 spray into each nostril daily, Disp: 1 Bottle, Rfl: 0    brompheniramine-pseudoephedrine-DM 30-2-10 MG/5ML syrup, Take 10 mL by mouth 4 (four) times a day as needed for cough (Patient not taking: Reported on 3/30/2020), Disp: 120 mL, Rfl: 0    doxycycline (ADOXA) 100 MG tablet, Take 1 tablet (100 mg total) by mouth 2 (two) times a day for 7 days, Disp: 14 tablet, Rfl: 0    fluconazole (DIFLUCAN) 150 mg tablet, Take 1 tablet (150 mg total) by mouth once for 1 dose, Disp: 1 tablet, Rfl: 0    medroxyPROGESTERone (PROVERA) 10 mg tablet, Take 1 tablet (10 mg total) by mouth daily for 10 days, Disp: 10 tablet, Rfl: 0    sertraline (ZOLOFT) 25 mg tablet, Take 25 mg by mouth, Disp: , Rfl:     Current Allergies     Allergies as of 03/30/2020 - Reviewed 03/30/2020   Allergen Reaction Noted    Clarithromycin  09/10/2012    Methylprednisolone      Penicillins  09/10/2012    Prednisone  04/13/2015    Sulfa antibiotics  04/13/2015            The following portions of the patient's history were reviewed and updated as appropriate: allergies, current medications, past family history, past medical history, past social history, past surgical history and problem list      Past Medical History:   Diagnosis Date    Asthma     Constipation     Female pelvic pain     Headache     Hemorrhoid     Wears glasses        Past Surgical History:   Procedure Laterality Date    BUNIONECTOMY Right 1999    CARPAL TUNNEL RELEASE Bilateral     performed a year apart more than 6 years ago    COLONOSCOPY      CYSTECTOMY         Family History   Problem Relation Age of Onset    Breast cancer Maternal Aunt 54    Breast cancer Maternal Aunt 61    Lung cancer Maternal Grandmother 67    Stomach cancer Maternal Aunt 67    No Known Problems Mother     No Known Problems Father     No Known Problems Daughter     No Known Problems Maternal Grandfather     No Known Problems Paternal Grandmother     No Known Problems Paternal Grandfather     No Known Problems Daughter     No Known Problems Paternal Aunt          Medications have been verified          Objective   /80 (BP Location: Right arm, Patient Position: Sitting, Cuff Size: Adult)   Pulse 74   Temp 98 9 °F (37 2 °C) (Oral)   Resp 16   SpO2 98%        Physical Exam     Physical Exam

## 2020-03-30 NOTE — ASSESSMENT & PLAN NOTE
Doxycycline  Continue Astelin nasal spray  Use albuterol inhaler PRN  Continue nasal saline rinses and increase water intake  Follow up if symptoms worsen or perisist

## 2020-03-30 NOTE — PATIENT INSTRUCTIONS
Take Doxycycline as prescribed, take with food  Wear sunscreen when outside  Continue Astelin nasal spray daily  Increase your water intake and use nasal saline rinses  Recommend Mucinex as needed  Take Diflucan if symptoms of a yeast infection occur  Use Albuterol inhaler if needed  Follow up with your PCP or return to the clinic if symptoms worsen or persist more than 3-5 days  Sinusitis, Ambulatory Care   GENERAL INFORMATION:   Sinusitis  is inflammation or infection of your sinuses  It is most often caused by a virus  Acute sinusitis may last up to 12 weeks  Chronic sinusitis lasts longer than 12 weeks  Recurrent sinusitis is when you have 3 or more episodes of sinusitis in 1 year  Common symptoms include the following:   · Fever    · Pain, pressure, redness, or swelling around the forehead, cheeks, or eyes    · Thick yellow or green discharge from your nose    · Tenderness when you touch your face over your sinuses    · Dry cough that happens mostly at night or when you lie down    · Headache and face pain that is worse when you lean forward    · Teeth pain or pain when you chew  Seek immediate care for the following symptoms:   · Vision changes such as double vision    · Confusion or trouble thinking clearly    · Headache and stiff neck    · Trouble breathing  Treatment for sinusitis  may include medicines to relieve nasal and sinus congestion or to decrease pain and fever  Ask your healthcare provider which medicines you should take and how much is safe  Manage sinusitis:   · Drink liquids as directed  Ask your healthcare provider how much liquid to drink each day and which liquids are best for you  Liquids will help loosen and drain the mucus in your sinuses  · Breathe in steam   Heat a bowl of water until you see steam  Lean over the bowl and make a tent over your head with a large towel  Breathe deeply for about 20 minutes   Be careful not to get too close to the steam or burn yourself  Do this 3 times a day  You can also breathe deeply when you take a hot shower  · Rinse your sinuses  Use a sinus rinse device to rinse your nasal passages with a saline (salt water) solution  This will help thin the mucus in your nose and rinse away pollen and dirt  It will also help reduce swelling so you can breathe normally  Ask how often to do this  · Use heat on your sinuses  to decrease pain  Apply heat for 15 to 20 minutes every hour for as many days as directed  · Sleep with your head elevated  Place an extra pillow under your head before you go to sleep to help your sinuses drain  · Do not smoke and avoid secondhand smoke  If you smoke, it is never too late to quit  Ask for information about how to stop smoking if you need help  Prevent the spread of germs that cause sinusitis:  Wash your hands often with soap and water  Wash your hands after you use the bathroom, change a child's diaper, or sneeze  Wash your hands before you prepare or eat food  Follow up with your healthcare provider as directed:  Write down your questions so you remember to ask them during your visits  CARE AGREEMENT:   You have the right to help plan your care  Learn about your health condition and how it may be treated  Discuss treatment options with your caregivers to decide what care you want to receive  You always have the right to refuse treatment  The above information is an  only  It is not intended as medical advice for individual conditions or treatments  Talk to your doctor, nurse or pharmacist before following any medical regimen to see if it is safe and effective for you  © 2014 [address] is for End User's use only and may not be sold, redistributed or otherwise used for commercial purposes  All illustrations and images included in CareNotes® are the copyrighted property of [employer]  or Paul Reinoso

## 2020-04-08 ENCOUNTER — TELEMEDICINE (OUTPATIENT)
Dept: SURGICAL ONCOLOGY | Facility: CLINIC | Age: 50
End: 2020-04-08
Payer: COMMERCIAL

## 2020-04-08 ENCOUNTER — TELEPHONE (OUTPATIENT)
Dept: SURGICAL ONCOLOGY | Facility: CLINIC | Age: 50
End: 2020-04-08

## 2020-04-08 DIAGNOSIS — R92.2 DENSE BREAST TISSUE: Primary | ICD-10-CM

## 2020-04-08 DIAGNOSIS — Z80.3 FAMILY HISTORY OF BREAST CANCER: ICD-10-CM

## 2020-04-08 DIAGNOSIS — Z91.89 INCREASED RISK OF BREAST CANCER: ICD-10-CM

## 2020-04-08 DIAGNOSIS — Z12.39 BREAST CANCER SCREENING OTHER THAN MAMMOGRAM: ICD-10-CM

## 2020-04-08 PROCEDURE — 99441 PR PHYS/QHP TELEPHONE EVALUATION 5-10 MIN: CPT | Performed by: SURGERY

## 2020-07-28 ENCOUNTER — HOSPITAL ENCOUNTER (OUTPATIENT)
Dept: ULTRASOUND IMAGING | Facility: CLINIC | Age: 50
Discharge: HOME | End: 2020-07-28
Payer: COMMERCIAL

## 2020-07-28 VITALS — HEIGHT: 66 IN | WEIGHT: 153 LBS | BODY MASS INDEX: 24.59 KG/M2

## 2020-07-28 DIAGNOSIS — Z12.39 BREAST CANCER SCREENING OTHER THAN MAMMOGRAM: ICD-10-CM

## 2020-07-28 DIAGNOSIS — R92.2 DENSE BREAST TISSUE: ICD-10-CM

## 2020-07-28 PROCEDURE — 76377 3D RENDER W/INTRP POSTPROCES: CPT

## 2020-07-28 PROCEDURE — 76641 ULTRASOUND BREAST COMPLETE: CPT

## 2020-08-28 ENCOUNTER — TELEPHONE (OUTPATIENT)
Dept: GYNECOLOGIC ONCOLOGY | Facility: CLINIC | Age: 50
End: 2020-08-28

## 2020-08-28 NOTE — TELEPHONE ENCOUNTER
Phone call to patient to complete screening questions for upcoming appointment on 9/1 with Dr Yuli Yusuf  Left message to return my call

## 2020-09-01 ENCOUNTER — OFFICE VISIT (OUTPATIENT)
Dept: SURGICAL ONCOLOGY | Facility: CLINIC | Age: 50
End: 2020-09-01
Payer: COMMERCIAL

## 2020-09-01 VITALS
HEIGHT: 66 IN | TEMPERATURE: 97 F | WEIGHT: 151 LBS | BODY MASS INDEX: 24.27 KG/M2 | SYSTOLIC BLOOD PRESSURE: 130 MMHG | DIASTOLIC BLOOD PRESSURE: 80 MMHG | HEART RATE: 89 BPM

## 2020-09-01 DIAGNOSIS — Z12.31 SCREENING MAMMOGRAM FOR HIGH-RISK PATIENT: ICD-10-CM

## 2020-09-01 DIAGNOSIS — R92.2 DENSE BREAST TISSUE: Primary | ICD-10-CM

## 2020-09-01 DIAGNOSIS — Z91.89 INCREASED RISK OF BREAST CANCER: ICD-10-CM

## 2020-09-01 DIAGNOSIS — Z12.39 BREAST CANCER SCREENING OTHER THAN MAMMOGRAM: ICD-10-CM

## 2020-09-01 DIAGNOSIS — Z80.3 FAMILY HISTORY OF BREAST CANCER: ICD-10-CM

## 2020-09-01 PROCEDURE — 99213 OFFICE O/P EST LOW 20 MIN: CPT | Performed by: SURGERY

## 2020-09-01 RX ORDER — BUDESONIDE AND FORMOTEROL FUMARATE DIHYDRATE 160; 4.5 UG/1; UG/1
AEROSOL RESPIRATORY (INHALATION)
COMMUNITY

## 2020-09-01 NOTE — PROGRESS NOTES
Surgical Oncology Follow Up       [address] [facility] SURGICAL ONCOLOGY [facility] [address]    Wing Woodson  1970  [id number]  [facility] SURGICAL ONCOLOGY [place]  [place] [address]    Chief Complaint   Patient presents with    Follow-up       Assessment/Plan   Diagnoses and all orders for this visit:    Dense breast tissue  -     US breast screening bilateral complete (ABUS); Future    Breast cancer screening other than mammogram  -     US breast screening bilateral complete (ABUS); Future    Family history of breast cancer    Increased risk of breast cancer    Screening mammogram for high-risk patient  -     Mammo screening bilateral w 3d & cad; Future    Other orders  -     budesonide-formoterol (Symbicort) 160-4 5 mcg/act inhaler; Symbicort 160 mcg-4 5 mcg/actuation HFA aerosol inhaler   INHALE 2 PUFFS TWO TIMES A DAY        Advance Care Planning/Advance Directives:  Did not discuss  with the patient  Oncology History:    Oncology History    No history exists  History of Present Illness: Follow-up visit secondary to dense breast tissue and family history of breast cancer, no concerns  -Interval History:  Recent automated breast ultrasound    Review of Systems:  Review of Systems   Constitutional: Negative  Negative for appetite change and fever  Eyes: Negative  Respiratory: Negative for shortness of breath  Cardiovascular: Negative  Gastrointestinal: Negative  Endocrine: Negative  Genitourinary: Negative  Musculoskeletal: Negative  Negative for arthralgias and myalgias  Skin: Negative  Allergic/Immunologic: Negative  Neurological: Negative  Hematological: Negative  Negative for adenopathy  Does not bruise/bleed easily  Psychiatric/Behavioral: Negative          Patient Active Problem List   Diagnosis    Encounter for routine gynecological examination with Papanicolaou smear of cervix    Dense breast tissue    Family history of breast cancer    Increased risk of breast cancer    Screening mammogram for high-risk patient    Breast cancer screening other than mammogram    Acute non-recurrent frontal sinusitis    Vaginal candidiasis    Asthma     Past Medical History:   Diagnosis Date    Asthma     Constipation     Female pelvic pain     Headache     Hemorrhoid     Wears glasses      Past Surgical History:   Procedure Laterality Date    BUNIONECTOMY Right     CARPAL TUNNEL RELEASE Bilateral     performed a year apart more than 6 years ago    COLONOSCOPY      CYSTECTOMY       Family History   Problem Relation Age of Onset    Breast cancer Maternal Aunt 54    Breast cancer Maternal Aunt 61    Lung cancer Maternal Grandmother 67    Stomach cancer Maternal Aunt 67    No Known Problems Mother     No Known Problems Father     No Known Problems Daughter     No Known Problems Maternal Grandfather     No Known Problems Paternal Grandmother     No Known Problems Paternal Grandfather     No Known Problems Daughter     No Known Problems Paternal Aunt      Social History     Socioeconomic History    Marital status: /Civil Union     Spouse name: Not on file    Number of children: Not on file    Years of education: Not on file    Highest education level: Not on file   Occupational History    Not on file   Social Needs    Financial resource strain: Not on file    Food insecurity     Worry: Not on file     Inability: Not on file    Transportation needs     Medical: Not on file     Non-medical: Not on file   Tobacco Use    Smoking status: Former Smoker     Last attempt to quit: 2019     Years since quittin 5    Smokeless tobacco: Never Used   Substance and Sexual Activity    Alcohol use: Yes     Frequency: 2-4 times a month     Comment: 1-2 drinks per week    Drug use: No    Sexual activity: Yes     Partners: Male Birth control/protection: Male Sterilization   Lifestyle    Physical activity     Days per week: Not on file     Minutes per session: Not on file    Stress: Not on file   Relationships    Social connections     Talks on phone: Not on file     Gets together: Not on file     Attends Restorationist service: Not on file     Active member of club or organization: Not on file     Attends meetings of clubs or organizations: Not on file     Relationship status: Not on file    Intimate partner violence     Fear of current or ex partner: Not on file     Emotionally abused: Not on file     Physically abused: Not on file     Forced sexual activity: Not on file   Other Topics Concern    Not on file   Social History Narrative    Not on file       Current Outpatient Medications:     ALPRAZolam (XANAX) 0 25 mg tablet, Take 1 tab by mouth daily as needed for anxiety, Disp: , Rfl:     azelastine (ASTELIN) 0 1 % nasal spray, 1 spray into each nostril daily, Disp: 1 Bottle, Rfl: 0    budesonide-formoterol (Symbicort) 160-4 5 mcg/act inhaler, Symbicort 160 mcg-4 5 mcg/actuation HFA aerosol inhaler  INHALE 2 PUFFS TWO TIMES A DAY, Disp: , Rfl:     cetirizine (ZyrTEC) 10 mg tablet, Take 10 mg by mouth every 24 hours, Disp: , Rfl:     Loratadine 10 MG CAPS, Take by mouth, Disp: , Rfl:     olopatadine HCl (PATADAY) 0 2 % opth drops, , Disp: , Rfl:     brompheniramine-pseudoephedrine-DM 30-2-10 MG/5ML syrup, Take 10 mL by mouth 4 (four) times a day as needed for cough (Patient not taking: Reported on 9/1/2020), Disp: 120 mL, Rfl: 0    drospirenone-ethinyl estradiol (WILFREDO) 3-0 02 MG per tablet, Take 1 tablet by mouth daily (Patient not taking: Reported on 9/1/2020), Disp: 90 tablet, Rfl: 4    medroxyPROGESTERone (PROVERA) 10 mg tablet, Take 1 tablet (10 mg total) by mouth daily for 10 days, Disp: 10 tablet, Rfl: 0    sertraline (ZOLOFT) 25 mg tablet, Take 25 mg by mouth, Disp: , Rfl:   Allergies   Allergen Reactions    Clarithromycin     Methylprednisolone     Penicillins     Prednisone      Other reaction(s): heart racing, jittery    Sulfa Antibiotics        The following portions of the patient's history were reviewed and updated as appropriate: allergies, current medications, past family history, past medical history, past social history, past surgical history and problem list         Vitals:    09/01/20 1528   BP: 130/80   Pulse:    Temp:        Physical Exam  Constitutional:       General: She is not in acute distress  Appearance: Normal appearance  She is well-developed  HENT:      Head: Normocephalic and atraumatic  Chest:      Breasts:         Right: No inverted nipple, mass, nipple discharge, skin change or tenderness  Left: No inverted nipple, mass, nipple discharge, skin change or tenderness  Lymphadenopathy:      Upper Body:      Right upper body: No supraclavicular, axillary or pectoral adenopathy  Left upper body: No supraclavicular, axillary or pectoral adenopathy  Neurological:      Mental Status: She is alert and oriented to person, place, and time  Psychiatric:         Mood and Affect: Mood normal            Results:  Labs:      Imaging  07/28/2020 automated breast ultrasound is benign    I reviewed the above imaging data  Discussion/Summary:  50-year-old female with dense breast tissue and family history of breast cancer  There are no concerns on examination today  Her recent automated breast ultrasound was benign  I will make arrangements for her mammogram due in January  I will order her ultrasound again for next year  I will plan to see her again in one year or sooner should the need arise

## 2020-10-21 DIAGNOSIS — B37.3 VAGINAL YEAST INFECTION: Primary | ICD-10-CM

## 2020-10-21 RX ORDER — FLUCONAZOLE 150 MG/1
150 TABLET ORAL ONCE
Qty: 1 TABLET | Refills: 0 | Status: SHIPPED | OUTPATIENT
Start: 2020-10-21 | End: 2020-10-21

## 2021-03-05 ENCOUNTER — HOSPITAL ENCOUNTER (OUTPATIENT)
Dept: MAMMOGRAPHY | Facility: MEDICAL CENTER | Age: 51
Discharge: HOME | End: 2021-03-05
Payer: COMMERCIAL

## 2021-03-05 VITALS — WEIGHT: 139 LBS | HEIGHT: 66 IN | BODY MASS INDEX: 22.34 KG/M2

## 2021-03-05 DIAGNOSIS — Z12.31 SCREENING MAMMOGRAM FOR HIGH-RISK PATIENT: ICD-10-CM

## 2021-03-05 PROCEDURE — 77063 BREAST TOMOSYNTHESIS BI: CPT

## 2021-03-05 PROCEDURE — 77067 SCR MAMMO BI INCL CAD: CPT

## 2021-03-22 ENCOUNTER — OFFICE VISIT (OUTPATIENT)
Dept: URGENT CARE | Facility: CLINIC | Age: 51
End: 2021-03-22

## 2021-03-22 VITALS
RESPIRATION RATE: 16 BRPM | DIASTOLIC BLOOD PRESSURE: 70 MMHG | SYSTOLIC BLOOD PRESSURE: 110 MMHG | HEART RATE: 84 BPM | OXYGEN SATURATION: 98 % | TEMPERATURE: 98.4 F

## 2021-03-22 DIAGNOSIS — J01.10 ACUTE NON-RECURRENT FRONTAL SINUSITIS: Primary | ICD-10-CM

## 2021-03-22 RX ORDER — FLUCONAZOLE 150 MG/1
150 TABLET ORAL ONCE
Qty: 1 TABLET | Refills: 0 | Status: SHIPPED | OUTPATIENT
Start: 2021-03-22 | End: 2021-03-22

## 2021-03-22 RX ORDER — AZITHROMYCIN 250 MG/1
TABLET, FILM COATED ORAL
Qty: 6 TABLET | Refills: 0 | Status: SHIPPED | OUTPATIENT
Start: 2021-03-22 | End: 2021-03-26

## 2021-03-22 NOTE — PATIENT INSTRUCTIONS
If symptoms worsen or persist in 48 hours, start antibiotic as prescribed  Increase water intake and use nasal saline rinses  Continue Astelin nasal spray and Loratadine as needed  Take Diflucan if you develop symptoms of a yeast infection  Follow up with your PCP or return to the clinic if symptoms worsen or persist more than 5-7 days  Sinusitis, Ambulatory Care   GENERAL INFORMATION:   Sinusitis  is inflammation or infection of your sinuses  It is most often caused by a virus  Acute sinusitis may last up to 12 weeks  Chronic sinusitis lasts longer than 12 weeks  Recurrent sinusitis is when you have 3 or more episodes of sinusitis in 1 year  Common symptoms include the following:   · Fever    · Pain, pressure, redness, or swelling around the forehead, cheeks, or eyes    · Thick yellow or green discharge from your nose    · Tenderness when you touch your face over your sinuses    · Dry cough that happens mostly at night or when you lie down    · Headache and face pain that is worse when you lean forward    · Teeth pain or pain when you chew  Seek immediate care for the following symptoms:   · Vision changes such as double vision    · Confusion or trouble thinking clearly    · Headache and stiff neck    · Trouble breathing  Treatment for sinusitis  may include medicines to relieve nasal and sinus congestion or to decrease pain and fever  Ask your healthcare provider which medicines you should take and how much is safe  Manage sinusitis:   · Drink liquids as directed  Ask your healthcare provider how much liquid to drink each day and which liquids are best for you  Liquids will help loosen and drain the mucus in your sinuses  · Breathe in steam   Heat a bowl of water until you see steam  Lean over the bowl and make a tent over your head with a large towel  Breathe deeply for about 20 minutes  Be careful not to get too close to the steam or burn yourself  Do this 3 times a day   You can also breathe deeply when you take a hot shower  · Rinse your sinuses  Use a sinus rinse device to rinse your nasal passages with a saline (salt water) solution  This will help thin the mucus in your nose and rinse away pollen and dirt  It will also help reduce swelling so you can breathe normally  Ask how often to do this  · Use heat on your sinuses  to decrease pain  Apply heat for 15 to 20 minutes every hour for as many days as directed  · Sleep with your head elevated  Place an extra pillow under your head before you go to sleep to help your sinuses drain  · Do not smoke and avoid secondhand smoke  If you smoke, it is never too late to quit  Ask for information about how to stop smoking if you need help  Prevent the spread of germs that cause sinusitis:  Wash your hands often with soap and water  Wash your hands after you use the bathroom, change a child's diaper, or sneeze  Wash your hands before you prepare or eat food  Follow up with your healthcare provider as directed:  Write down your questions so you remember to ask them during your visits  CARE AGREEMENT:   You have the right to help plan your care  Learn about your health condition and how it may be treated  Discuss treatment options with your caregivers to decide what care you want to receive  You always have the right to refuse treatment  The above information is an  only  It is not intended as medical advice for individual conditions or treatments  Talk to your doctor, nurse or pharmacist before following any medical regimen to see if it is safe and effective for you  © 2014 [address] is for End User's use only and may not be sold, redistributed or otherwise used for commercial purposes  All illustrations and images included in CareNotes® are the copyrighted property of [employer]  or Paul Reinoso

## 2021-03-22 NOTE — ASSESSMENT & PLAN NOTE
Z-sharath in 48 hours if symptoms worsen or persist, do not take if symptoms improve  Rx written for Diflucan PRN yeast infection symptoms as pt requested, pt reports hx vaginal yeast infections with antibiotic use  Continue Astelin nasal spray  Recommend nasal saline rinses and increase water intake  Follow up if symptoms worsen or persist

## 2021-03-22 NOTE — PROGRESS NOTES
[facility] [facility]        NAME: Kirill Diamond is a 45 y o  female  : 1970    MRN: [id number]  DATE: 2021  TIME: 10:55 AM    Assessment and Plan   Acute non-recurrent frontal sinusitis [J01 10]  1  Acute non-recurrent frontal sinusitis  azithromycin (ZITHROMAX) 250 mg tablet    fluconazole (DIFLUCAN) 150 mg tablet         Patient Instructions       If symptoms worsen or persist in 48 hours, start antibiotic as prescribed  Increase water intake and use nasal saline rinses  Continue Astelin nasal spray and Loratadine as needed  Take Diflucan if you develop symptoms of a yeast infection  Follow up with your PCP or return to the clinic if symptoms worsen or persist more than 5-7 days  Proceed to  ER if symptoms worsen  Chief Complaint     Chief Complaint   Patient presents with    Sinusitis         History of Present Illness       Sinus pressure, worse behind L eye, worse at night x 2-3 days  Post-nasal drip present  No fevers or chills  She had a COVID-19 vaccine, series completed 21  This feels like sinus infections she gets this time of year  Review of Systems   Review of Systems   Constitutional: Negative  HENT: Positive for congestion, postnasal drip, rhinorrhea, sinus pressure, sinus pain and sore throat  Negative for trouble swallowing  Eyes: Negative  Respiratory: Positive for cough  Negative for shortness of breath and wheezing  Choking: secondary to post-nasal drip     Cardiovascular: Negative  Skin: Negative  Allergic/Immunologic: Positive for environmental allergies  All other systems reviewed and are negative          Current Medications       Current Outpatient Medications:     ALPRAZolam (XANAX) 0 25 mg tablet, Take 1 tab by mouth daily as needed for anxiety, Disp: , Rfl:     azelastine (ASTELIN) 0 1 % nasal spray, 1 spray into each nostril daily, Disp: 1 Bottle, Rfl: 0    budesonide-formoterol (Symbicort) 160-4 5 mcg/act inhaler, Symbicort 160 mcg-4 5 mcg/actuation HFA aerosol inhaler  INHALE 2 PUFFS TWO TIMES A DAY, Disp: , Rfl:     cetirizine (ZyrTEC) 10 mg tablet, Take 10 mg by mouth every 24 hours, Disp: , Rfl:     Loratadine 10 MG CAPS, Take by mouth, Disp: , Rfl:     olopatadine HCl (PATADAY) 0 2 % opth drops, , Disp: , Rfl:     azithromycin (ZITHROMAX) 250 mg tablet, Take 2 tablets today then 1 tablet daily x 4 days, Disp: 6 tablet, Rfl: 0    brompheniramine-pseudoephedrine-DM 30-2-10 MG/5ML syrup, Take 10 mL by mouth 4 (four) times a day as needed for cough, Disp: 120 mL, Rfl: 0    drospirenone-ethinyl estradiol (WILFREDO) 3-0 02 MG per tablet, Take 1 tablet by mouth daily (Patient not taking: Reported on 3/22/2021), Disp: 90 tablet, Rfl: 4    fluconazole (DIFLUCAN) 150 mg tablet, Take 1 tablet (150 mg total) by mouth once for 1 dose If signs of yeast infection develop, Disp: 1 tablet, Rfl: 0    medroxyPROGESTERone (PROVERA) 10 mg tablet, Take 1 tablet (10 mg total) by mouth daily for 10 days, Disp: 10 tablet, Rfl: 0    sertraline (ZOLOFT) 25 mg tablet, Take 25 mg by mouth, Disp: , Rfl:     Current Allergies     Allergies as of 03/22/2021 - Reviewed 03/22/2021   Allergen Reaction Noted    Methylprednisolone      Penicillins  09/10/2012    Prednisone  04/13/2015    Sulfa antibiotics  04/13/2015            The following portions of the patient's history were reviewed and updated as appropriate: allergies, current medications, past family history, past medical history, past social history, past surgical history and problem list      Past Medical History:   Diagnosis Date    Asthma     Constipation     Female pelvic pain     Headache     Hemorrhoid     Wears glasses        Past Surgical History:   Procedure Laterality Date    BUNIONECTOMY Right 1999    CARPAL TUNNEL RELEASE Bilateral     performed a year apart more than 6 years ago    COLONOSCOPY      CYSTECTOMY         Family History   Problem Relation Age of Onset    Breast cancer Maternal Aunt 54    Breast cancer Maternal Aunt 61    Lung cancer Maternal Grandmother 67    Stomach cancer Maternal Aunt 67    No Known Problems Mother     No Known Problems Father     No Known Problems Daughter     No Known Problems Maternal Grandfather     No Known Problems Paternal Grandmother     No Known Problems Paternal Grandfather     No Known Problems Daughter     No Known Problems Paternal Aunt          Medications have been verified  Objective   /70 (BP Location: Right arm, Patient Position: Sitting, Cuff Size: Adult)   Pulse 84   Temp 98 4 °F (36 9 °C) (Tympanic)   Resp 16   LMP 02/21/2021   SpO2 98%   Patient's last menstrual period was 02/21/2021  Physical Exam     Physical Exam  Vitals signs reviewed  Constitutional:       Appearance: Normal appearance  She is well-developed  HENT:      Head: Normocephalic and atraumatic  Right Ear: Hearing, ear canal and external ear normal  A middle ear effusion is present  Left Ear: Hearing, ear canal and external ear normal  A middle ear effusion is present  Nose: Mucosal edema and rhinorrhea present  Rhinorrhea is purulent  Right Turbinates: Swollen  Left Turbinates: Swollen  Right Sinus: Frontal sinus tenderness present  Left Sinus: Frontal sinus tenderness present  Mouth/Throat:      Lips: Pink  Mouth: Mucous membranes are moist       Pharynx: Uvula midline  Posterior oropharyngeal erythema (cobblestoning appearance to throat) present  No oropharyngeal exudate  Tonsils: No tonsillar exudate  1+ on the right  1+ on the left  Eyes:      Conjunctiva/sclera: Conjunctivae normal       Pupils: Pupils are equal, round, and reactive to light  Neck:      Musculoskeletal: Normal range of motion and neck supple  Cardiovascular:      Rate and Rhythm: Normal rate and regular rhythm  Heart sounds: Normal heart sounds     Pulmonary:      Effort: Pulmonary effort is normal       Breath sounds: Normal breath sounds  Skin:     General: Skin is warm and dry  Neurological:      Mental Status: She is alert and oriented to person, place, and time     Psychiatric:         Behavior: Behavior normal

## 2021-03-24 ENCOUNTER — ANNUAL EXAM (OUTPATIENT)
Dept: OBGYN CLINIC | Facility: MEDICAL CENTER | Age: 51
End: 2021-03-24
Payer: COMMERCIAL

## 2021-03-24 VITALS — SYSTOLIC BLOOD PRESSURE: 120 MMHG | DIASTOLIC BLOOD PRESSURE: 70 MMHG

## 2021-03-24 DIAGNOSIS — Z01.419 ENCOUNTER FOR WELL WOMAN EXAM WITH ROUTINE GYNECOLOGICAL EXAM: Primary | ICD-10-CM

## 2021-03-24 DIAGNOSIS — N95.1 VASOMOTOR SYMPTOMS DUE TO MENOPAUSE: ICD-10-CM

## 2021-03-24 PROCEDURE — S0612 ANNUAL GYNECOLOGICAL EXAMINA: HCPCS | Performed by: OBSTETRICS & GYNECOLOGY

## 2021-03-24 RX ORDER — PAROXETINE 10 MG/1
10 TABLET, FILM COATED ORAL DAILY
Qty: 30 TABLET | Refills: 11 | Status: SHIPPED | OUTPATIENT
Start: 2021-03-24 | End: 2021-11-04 | Stop reason: ALTCHOICE

## 2021-03-24 NOTE — PROGRESS NOTES
ASSESSMENT & PLAN: Miri Genao is a 45 y o  [id number] with normal gynecologic exam     1   Routine well woman exam done today  2  Pap and HPV:  The patient's last pap and hpv was   It was normal   Pap with reflex cotesting was not done today  Current ASCCP Guidelines reviewed  Requests every 2 years  3  Mammogram followed by Dr Eduin Barba  4  Colorectal cancer screening was not ordered  Done   5  The following were reviewed in today's visit: breast self exam and  screening   6  Vasomotor symptoms, will trial Paxil  7  Vaginal dryness: start vaginal estrogen    CC:  Annual Gynecologic Examination    HPI: Miri Genao is a 45 y o  [id number] who presents for annual gynecologic examination  She has the following concerns:  Complains of hot flashes, night sweats, and weight gain  Also reports vaginal dryness  Health Maintenance:    She wears her seatbelt routinely  She does perform regular monthly self breast exams  She feels safe at home  Pap:   Last mammogram:   Last colonoscopy:     Past Medical History:   Diagnosis Date    Asthma     Constipation     Female pelvic pain     Headache     Hemorrhoid     Wears glasses        Past Surgical History:   Procedure Laterality Date    BUNIONECTOMY Right     CARPAL TUNNEL RELEASE Bilateral     performed a year apart more than 6 years ago    COLONOSCOPY      CYSTECTOMY         Past OB/Gyn History:  OB History        4    Para   2    Term   2            AB   2    Living   2       SAB   2    TAB        Ectopic        Multiple        Live Births   2           Obstetric Comments   Menarche : 11  Hx of BCP and Depo provera              Pt does not have menstrual issues  regular   History of sexually transmitted infection: No   History of abnormal pap smears: No      Patient is currently sexually active        Family History   Problem Relation Age of Onset    Breast cancer Maternal Aunt 54    Breast cancer Maternal Aunt 61  Lung cancer Maternal Grandmother 67    Stomach cancer Maternal Aunt 67    No Known Problems Mother     No Known Problems Father     No Known Problems Daughter     No Known Problems Maternal Grandfather     No Known Problems Paternal Grandmother     No Known Problems Paternal Grandfather     No Known Problems Daughter     No Known Problems Paternal Aunt        Social History:  Social History     Socioeconomic History    Marital status: /Civil Union     Spouse name: Not on file    Number of children: Not on file    Years of education: Not on file    Highest education level: Not on file   Occupational History    Not on file   Social Needs    Financial resource strain: Not on file    Food insecurity     Worry: Not on file     Inability: Not on file    Transportation needs     Medical: Not on file     Non-medical: Not on file   Tobacco Use    Smoking status: Current Some Day Smoker     Last attempt to quit: 2019     Years since quittin 1    Smokeless tobacco: Never Used   Substance and Sexual Activity    Alcohol use: Yes     Frequency: 2-4 times a month     Comment: 1-2 drinks per week    Drug use: No    Sexual activity: Yes     Partners: Male     Birth control/protection: Male Sterilization   Lifestyle    Physical activity     Days per week: Not on file     Minutes per session: Not on file    Stress: Not on file   Relationships    Social connections     Talks on phone: Not on file     Gets together: Not on file     Attends Sikhism service: Not on file     Active member of club or organization: Not on file     Attends meetings of clubs or organizations: Not on file     Relationship status: Not on file    Intimate partner violence     Fear of current or ex partner: Not on file     Emotionally abused: Not on file     Physically abused: Not on file     Forced sexual activity: Not on file   Other Topics Concern    Not on file   Social History Narrative    Not on file Allergies   Allergen Reactions    Methylprednisolone     Penicillins     Prednisone      Other reaction(s): heart racing, jittery    Sulfa Antibiotics        Current Outpatient Medications:     ALPRAZolam (XANAX) 0 25 mg tablet, Take 1 tab by mouth daily as needed for anxiety, Disp: , Rfl:     azelastine (ASTELIN) 0 1 % nasal spray, 1 spray into each nostril daily, Disp: 1 Bottle, Rfl: 0    azithromycin (ZITHROMAX) 250 mg tablet, Take 2 tablets today then 1 tablet daily x 4 days, Disp: 6 tablet, Rfl: 0    brompheniramine-pseudoephedrine-DM 30-2-10 MG/5ML syrup, Take 10 mL by mouth 4 (four) times a day as needed for cough, Disp: 120 mL, Rfl: 0    budesonide-formoterol (Symbicort) 160-4 5 mcg/act inhaler, Symbicort 160 mcg-4 5 mcg/actuation HFA aerosol inhaler  INHALE 2 PUFFS TWO TIMES A DAY, Disp: , Rfl:     cetirizine (ZyrTEC) 10 mg tablet, Take 10 mg by mouth every 24 hours, Disp: , Rfl:     Loratadine 10 MG CAPS, Take by mouth, Disp: , Rfl:     olopatadine HCl (PATADAY) 0 2 % opth drops, , Disp: , Rfl:     drospirenone-ethinyl estradiol (WILFREDO) 3-0 02 MG per tablet, Take 1 tablet by mouth daily (Patient not taking: Reported on 3/22/2021), Disp: 90 tablet, Rfl: 4    medroxyPROGESTERone (PROVERA) 10 mg tablet, Take 1 tablet (10 mg total) by mouth daily for 10 days, Disp: 10 tablet, Rfl: 0    sertraline (ZOLOFT) 25 mg tablet, Take 25 mg by mouth, Disp: , Rfl:       Review of Systems  Constitutional :no fever, feels well, no tiredness, no recent weight gain or loss  ENT: no ear ache, no loss of hearing, no nosebleeds or nasal discharge, no sore throat or hoarseness  Cardiovascular: no complaints of slow or fast heart beat, no chest pain, no palpitations, no leg claudication or lower extremity edema    Respiratory: no complaints of shortness of shortness of breath, no SINGLETON  Breasts:no complaints of breast pain, breast lump, or nipple discharge  Gastrointestinal: no complaints of abdominal pain, constipation, nausea, vomiting, or diarrhea or bloody stools  Genitourinary : no complaints of dysuria, incontinence, pelvic pain, no dysmenorrhea, vaginal discharge or abnormal vaginal bleeding and as noted in HPI  Musculoskeletal: no complaints of arthralgia, no myalgia, no joint swelling or stiffness, no limb pain or swelling  Integumentary: no complaints of skin rash or lesion, itching or dry skin  Neurological: no complaints of headache, no confusion, no numbness or tingling, no dizziness or fainting    Objective      /70   LMP 03/15/2021     General:   appears stated age, cooperative, alert normal mood and affect   Neck: normal, supple,trachea midline, no masses   Heart: regular rate and rhythm, S1, S2 normal, no murmur, click, rub or gallop   Lungs: clear to auscultation bilaterally   Breasts: normal appearance, no masses or tenderness, Inspection negative, No nipple retraction or dimpling, No nipple discharge or bleeding, No axillary or supraclavicular adenopathy, Normal to palpation without dominant masses   Abdomen: soft, non-tender, without masses or organomegaly   Vulva: normal female genitalia, Bartholin's, Urethra, [place] normal, no lesions, normal female hair distribution   Vagina: normal vagina, no discharge, exudate, lesion, or erythema   Urethra: normal   Cervix: Normal, no discharge   Uterus: normal size, contour, position, consistency, mobility, non-tender   Adnexa: normal adnexa and no mass, fullness, tenderness   Lymphatic palpation of lymph nodes in neck, axilla, groin and/or other locations: no lymphadenopathy or masses noted   Skin normal skin turgor and no rashes     Psychiatric orientation to person, place, and time: normal  mood and affect: normal

## 2021-05-24 DIAGNOSIS — B37.9 YEAST INFECTION: Primary | ICD-10-CM

## 2021-05-26 ENCOUNTER — TELEPHONE (OUTPATIENT)
Dept: OBGYN CLINIC | Facility: MEDICAL CENTER | Age: 51
End: 2021-05-26

## 2021-05-26 DIAGNOSIS — B37.9 YEAST INFECTION: Primary | ICD-10-CM

## 2021-05-26 RX ORDER — FLUCONAZOLE 150 MG/1
TABLET ORAL
Qty: 1 TABLET | Refills: 0 | Status: SHIPPED | OUTPATIENT
Start: 2021-05-26 | End: 2021-05-30

## 2021-05-26 NOTE — TELEPHONE ENCOUNTER
Patient called and stated that yeast infection medication was supposed to be sent to her two days ago but it never got sent  Pt would like rx to be sent to [facility] [address]  Please review

## 2021-05-27 RX ORDER — FLUCONAZOLE 150 MG/1
150 TABLET ORAL ONCE
Qty: 2 TABLET | Refills: 0 | Status: SHIPPED | OUTPATIENT
Start: 2021-05-27 | End: 2021-05-27

## 2021-06-24 ENCOUNTER — OFFICE VISIT (OUTPATIENT)
Dept: URGENT CARE | Facility: CLINIC | Age: 51
End: 2021-06-24

## 2021-06-24 VITALS
SYSTOLIC BLOOD PRESSURE: 112 MMHG | TEMPERATURE: 97.7 F | HEART RATE: 64 BPM | DIASTOLIC BLOOD PRESSURE: 74 MMHG | OXYGEN SATURATION: 98 % | RESPIRATION RATE: 16 BRPM

## 2021-06-24 DIAGNOSIS — Z91.09 ENVIRONMENTAL ALLERGIES: ICD-10-CM

## 2021-06-24 DIAGNOSIS — J01.10 ACUTE NON-RECURRENT FRONTAL SINUSITIS: Primary | ICD-10-CM

## 2021-06-24 DIAGNOSIS — J45.20 MILD INTERMITTENT ASTHMA WITHOUT COMPLICATION: ICD-10-CM

## 2021-06-24 RX ORDER — ALBUTEROL SULFATE 90 UG/1
2 AEROSOL, METERED RESPIRATORY (INHALATION) EVERY 6 HOURS PRN
Qty: 8.5 G | Refills: 0 | Status: SHIPPED | OUTPATIENT
Start: 2021-06-24 | End: 2021-07-24

## 2021-06-24 RX ORDER — OLOPATADINE HYDROCHLORIDE 2 MG/ML
1 SOLUTION/ DROPS OPHTHALMIC DAILY
Qty: 2.5 ML | Refills: 0 | Status: SHIPPED | OUTPATIENT
Start: 2021-06-24

## 2021-06-24 RX ORDER — AZITHROMYCIN 250 MG/1
TABLET, FILM COATED ORAL
Qty: 6 TABLET | Refills: 0 | Status: SHIPPED | OUTPATIENT
Start: 2021-06-24 | End: 2021-06-28

## 2021-06-24 NOTE — PROGRESS NOTES
[facility]        NAME: Wesly Rutherford is a 45 y o  female  : 1970    MRN: [id number]  DATE: 2021  TIME: 1:39 PM    Assessment and Plan   Acute non-recurrent frontal sinusitis [J01 10]  1  Acute non-recurrent frontal sinusitis  azithromycin (ZITHROMAX) 250 mg tablet   2  Mild intermittent asthma without complication  albuterol (ProAir HFA) 90 mcg/act inhaler   3  Environmental allergies  olopatadine HCl (PATADAY) 0 2 % opth drops         Patient Instructions       Take Z-sharath as prescribed  Continue Motrin and nasal saline rinses  Increase water intake  Use albuterol inhaler if needed for wheezing or shortness of breath  Go to the ER immediately if asthma symptoms are severe or albuterol inhaler is not helping  Follow up with your PCP or return to the clinic if symptoms worsen or persist more than 3-5 days  Proceed to  ER if symptoms worsen  Chief Complaint     Chief Complaint   Patient presents with    Sinusitis         History of Present Illness       Sinus pressure, sore throat, post-nasal drip x 4 days  Worse on Left side  Pressure/ pain around eyes  Subjective fever yesterday  She has been taking motrin & decongestant- mild improvement  This feels like previous sinus infections she has had  Symptoms are getting worse with time  Hx asthma, pt reports mild wheezing  She has not been using her inhaler  She is also requesting a refill of her pataday drops for seasonal allergy symptoms  She has been using them with good results and no problems reported  Review of Systems   Review of Systems   Constitutional: Negative  HENT: Positive for congestion, ear pain (ear pressure), postnasal drip, rhinorrhea, sinus pressure, sinus pain (behind eyes) and sore throat  Eyes: Negative  Respiratory: Positive for wheezing (mild)  Negative for cough and shortness of breath  Cardiovascular: Negative  Negative for chest pain  Musculoskeletal: Negative  Skin: Negative  Allergic/Immunologic: Positive for environmental allergies  All other systems reviewed and are negative          Current Medications       Current Outpatient Medications:     azelastine (ASTELIN) 0 1 % nasal spray, 1 spray into each nostril daily, Disp: 1 Bottle, Rfl: 0    budesonide-formoterol (Symbicort) 160-4 5 mcg/act inhaler, Symbicort 160 mcg-4 5 mcg/actuation HFA aerosol inhaler  INHALE 2 PUFFS TWO TIMES A DAY, Disp: , Rfl:     Loratadine 10 MG CAPS, Take by mouth, Disp: , Rfl:     olopatadine HCl (PATADAY) 0 2 % opth drops, Administer 1 drop to both eyes daily, Disp: 2 5 mL, Rfl: 0    albuterol (ProAir HFA) 90 mcg/act inhaler, Inhale 2 puffs every 6 (six) hours as needed for wheezing, Disp: 8 5 g, Rfl: 0    ALPRAZolam (XANAX) 0 25 mg tablet, Take 1 tab by mouth daily as needed for anxiety (Patient not taking: Reported on 6/24/2021), Disp: , Rfl:     azithromycin (ZITHROMAX) 250 mg tablet, Take 2 tablets today then 1 tablet daily x 4 days, Disp: 6 tablet, Rfl: 0    brompheniramine-pseudoephedrine-DM 30-2-10 MG/5ML syrup, Take 10 mL by mouth 4 (four) times a day as needed for cough (Patient not taking: Reported on 6/24/2021), Disp: 120 mL, Rfl: 0    cetirizine (ZyrTEC) 10 mg tablet, Take 10 mg by mouth every 24 hours (Patient not taking: Reported on 6/24/2021), Disp: , Rfl:     conjugated estrogens (PREMARIN) vaginal cream, Insert 1 g into the vagina daily (Patient not taking: Reported on 6/24/2021), Disp: 42 5 g, Rfl: 11    drospirenone-ethinyl estradiol (WILFREDO) 3-0 02 MG per tablet, Take 1 tablet by mouth daily (Patient not taking: Reported on 6/24/2021), Disp: 90 tablet, Rfl: 4    medroxyPROGESTERone (PROVERA) 10 mg tablet, Take 1 tablet (10 mg total) by mouth daily for 10 days, Disp: 10 tablet, Rfl: 0    PARoxetine (PAXIL) 10 mg tablet, Take 1 tablet (10 mg total) by mouth daily (Patient not taking: Reported on 6/24/2021), Disp: 30 tablet, Rfl: 11    sertraline (ZOLOFT) 25 mg tablet, Take 25 mg by mouth, Disp: , Rfl:     Current Allergies     Allergies as of 06/24/2021 - Reviewed 06/24/2021   Allergen Reaction Noted    Methylprednisolone      Penicillins Hives 09/10/2012    Prednisone  04/13/2015    Sulfa antibiotics  04/13/2015            The following portions of the patient's history were reviewed and updated as appropriate: allergies, current medications, past family history, past medical history, past social history, past surgical history and problem list      Past Medical History:   Diagnosis Date    Asthma     Constipation     Female pelvic pain     Headache     Hemorrhoid     Wears glasses        Past Surgical History:   Procedure Laterality Date    BUNIONECTOMY Right 1999    CARPAL TUNNEL RELEASE Bilateral     performed a year apart more than 6 years ago    COLONOSCOPY      CYSTECTOMY         Family History   Problem Relation Age of Onset    Breast cancer Maternal Aunt 54    Breast cancer Maternal Aunt 61    Lung cancer Maternal Grandmother 67    Stomach cancer Maternal Aunt 67    No Known Problems Mother     No Known Problems Father     No Known Problems Daughter     No Known Problems Maternal Grandfather     No Known Problems Paternal Grandmother     No Known Problems Paternal Grandfather     No Known Problems Daughter     No Known Problems Paternal Aunt          Medications have been verified  Objective   /74 (BP Location: Right arm, Patient Position: Sitting, Cuff Size: Adult)   Pulse 64   Temp 97 7 °F (36 5 °C) (Tympanic)   Resp 16   SpO2 98%   No LMP recorded  Physical Exam     Physical Exam  Vitals reviewed  Constitutional:       Appearance: Normal appearance  She is well-developed  HENT:      Head: Normocephalic and atraumatic  Jaw: There is normal jaw occlusion  Right Ear: Hearing, ear canal and external ear normal  A middle ear effusion is present  Tympanic membrane is not erythematous        Left Ear: Hearing, ear canal and external ear normal  A middle ear effusion is present  Tympanic membrane is not erythematous  Nose: Mucosal edema and rhinorrhea present  Rhinorrhea is purulent  Right Sinus: Frontal sinus tenderness present  Left Sinus: Frontal sinus tenderness present  Comments: Sinus tenderness worse on left     Mouth/Throat:      Pharynx: Uvula midline  No oropharyngeal exudate  Eyes:      Conjunctiva/sclera: Conjunctivae normal       Pupils: Pupils are equal, round, and reactive to light  Cardiovascular:      Rate and Rhythm: Normal rate and regular rhythm  Pulses: Normal pulses  Heart sounds: Normal heart sounds  Pulmonary:      Effort: Pulmonary effort is normal  No respiratory distress  Breath sounds: Normal air entry  No stridor or decreased air movement  Examination of the right-lower field reveals wheezing  Examination of the left-lower field reveals wheezing  Wheezing (expiratory, mild) present  No decreased breath sounds  Musculoskeletal:         General: Normal range of motion  Cervical back: Normal range of motion and neck supple  Lymphadenopathy:      Head:      Right side of head: Submandibular adenopathy present  Left side of head: Submandibular adenopathy present  Skin:     General: Skin is warm and dry  Capillary Refill: Capillary refill takes less than 2 seconds  Neurological:      General: No focal deficit present  Mental Status: She is alert and oriented to person, place, and time     Psychiatric:         Mood and Affect: Mood normal          Behavior: Behavior normal

## 2021-06-24 NOTE — ASSESSMENT & PLAN NOTE
Mild expiratory wheezing noted on exam  Rx written for Albuterol rescue inhaler  Pt instructed on proper use and when to seek follow up care

## 2021-06-24 NOTE — PATIENT INSTRUCTIONS
Take Z-sharath as prescribed  Continue Motrin and nasal saline rinses  Increase water intake  Use albuterol inhaler if needed for wheezing or shortness of breath  Go to the ER immediately if asthma symptoms are severe or albuterol inhaler is not helping  Follow up with your PCP or return to the clinic if symptoms worsen or persist more than 3-5 days  Sinusitis, Ambulatory Care   GENERAL INFORMATION:   Sinusitis  is inflammation or infection of your sinuses  It is most often caused by a virus  Acute sinusitis may last up to 12 weeks  Chronic sinusitis lasts longer than 12 weeks  Recurrent sinusitis is when you have 3 or more episodes of sinusitis in 1 year  Common symptoms include the following:   · Fever    · Pain, pressure, redness, or swelling around the forehead, cheeks, or eyes    · Thick yellow or green discharge from your nose    · Tenderness when you touch your face over your sinuses    · Dry cough that happens mostly at night or when you lie down    · Headache and face pain that is worse when you lean forward    · Teeth pain or pain when you chew  Seek immediate care for the following symptoms:   · Vision changes such as double vision    · Confusion or trouble thinking clearly    · Headache and stiff neck    · Trouble breathing  Treatment for sinusitis  may include medicines to relieve nasal and sinus congestion or to decrease pain and fever  Ask your healthcare provider which medicines you should take and how much is safe  Manage sinusitis:   · Drink liquids as directed  Ask your healthcare provider how much liquid to drink each day and which liquids are best for you  Liquids will help loosen and drain the mucus in your sinuses  · Breathe in steam   Heat a bowl of water until you see steam  Lean over the bowl and make a tent over your head with a large towel  Breathe deeply for about 20 minutes  Be careful not to get too close to the steam or burn yourself  Do this 3 times a day   You can also breathe deeply when you take a hot shower  · Rinse your sinuses  Use a sinus rinse device to rinse your nasal passages with a saline (salt water) solution  This will help thin the mucus in your nose and rinse away pollen and dirt  It will also help reduce swelling so you can breathe normally  Ask how often to do this  · Use heat on your sinuses  to decrease pain  Apply heat for 15 to 20 minutes every hour for as many days as directed  · Sleep with your head elevated  Place an extra pillow under your head before you go to sleep to help your sinuses drain  · Do not smoke and avoid secondhand smoke  If you smoke, it is never too late to quit  Ask for information about how to stop smoking if you need help  Prevent the spread of germs that cause sinusitis:  Wash your hands often with soap and water  Wash your hands after you use the bathroom, change a child's diaper, or sneeze  Wash your hands before you prepare or eat food  Follow up with your healthcare provider as directed:  Write down your questions so you remember to ask them during your visits  CARE AGREEMENT:   You have the right to help plan your care  Learn about your health condition and how it may be treated  Discuss treatment options with your caregivers to decide what care you want to receive  You always have the right to refuse treatment  The above information is an  only  It is not intended as medical advice for individual conditions or treatments  Talk to your doctor, nurse or pharmacist before following any medical regimen to see if it is safe and effective for you  © 2014 [address] is for End User's use only and may not be sold, redistributed or otherwise used for commercial purposes  All illustrations and images included in CareNotes® are the copyrighted property of [employer]  or Paul Reinoso

## 2021-06-24 NOTE — ASSESSMENT & PLAN NOTE
History and exam findings consistent with sinusitis  Rx written for Z-sharath  Recommend continued symptomatic care  Reasons to follow up reviewed with pt

## 2021-08-31 ENCOUNTER — TELEPHONE (OUTPATIENT)
Dept: SURGICAL ONCOLOGY | Facility: CLINIC | Age: 51
End: 2021-08-31

## 2021-08-31 NOTE — TELEPHONE ENCOUNTER
Called patient and left a voice message requesting patients appointment be moved up from 3:45 pm to 3:15 pm on 9/16/2021 per Dr Vannesa Cueto request  Requested she return call to confirm wether she is able to come in earlier

## 2021-09-01 ENCOUNTER — HOSPITAL ENCOUNTER (OUTPATIENT)
Dept: ULTRASOUND IMAGING | Facility: CLINIC | Age: 51
Discharge: HOME | End: 2021-09-01
Payer: COMMERCIAL

## 2021-09-01 VITALS — HEIGHT: 66 IN | BODY MASS INDEX: 22.34 KG/M2 | WEIGHT: 139 LBS

## 2021-09-01 DIAGNOSIS — Z12.39 BREAST CANCER SCREENING OTHER THAN MAMMOGRAM: ICD-10-CM

## 2021-09-01 DIAGNOSIS — R92.2 DENSE BREAST TISSUE: ICD-10-CM

## 2021-09-01 PROCEDURE — 76641 ULTRASOUND BREAST COMPLETE: CPT

## 2021-09-02 ENCOUNTER — TELEPHONE (OUTPATIENT)
Dept: SURGICAL ONCOLOGY | Facility: CLINIC | Age: 51
End: 2021-09-02

## 2021-09-02 NOTE — TELEPHONE ENCOUNTER
Called patient in regards to coming in at 3:15 pm on 9/16/2021 to see Dr Cirilo Dutton as she had a cancellation earlier in the afternoon  Patient was agreeable to new time  Confirmed location

## 2021-09-16 ENCOUNTER — OFFICE VISIT (OUTPATIENT)
Dept: SURGICAL ONCOLOGY | Facility: CLINIC | Age: 51
End: 2021-09-16
Payer: COMMERCIAL

## 2021-09-16 VITALS
SYSTOLIC BLOOD PRESSURE: 98 MMHG | BODY MASS INDEX: 22.82 KG/M2 | HEART RATE: 87 BPM | TEMPERATURE: 99.6 F | HEIGHT: 66 IN | DIASTOLIC BLOOD PRESSURE: 70 MMHG | WEIGHT: 142 LBS | OXYGEN SATURATION: 99 % | RESPIRATION RATE: 16 BRPM

## 2021-09-16 DIAGNOSIS — R92.2 DENSE BREAST TISSUE: Primary | ICD-10-CM

## 2021-09-16 DIAGNOSIS — Z91.89 INCREASED RISK OF BREAST CANCER: ICD-10-CM

## 2021-09-16 DIAGNOSIS — Z12.39 BREAST CANCER SCREENING OTHER THAN MAMMOGRAM: ICD-10-CM

## 2021-09-16 DIAGNOSIS — Z80.3 FAMILY HISTORY OF BREAST CANCER: ICD-10-CM

## 2021-09-16 DIAGNOSIS — Z12.31 SCREENING MAMMOGRAM FOR HIGH-RISK PATIENT: ICD-10-CM

## 2021-09-16 PROCEDURE — 99213 OFFICE O/P EST LOW 20 MIN: CPT | Performed by: SURGERY

## 2021-09-16 RX ORDER — BUPROPION HYDROCHLORIDE 150 MG/1
150 TABLET, EXTENDED RELEASE ORAL 2 TIMES DAILY
COMMUNITY
Start: 2021-08-28

## 2021-09-16 NOTE — PROGRESS NOTES
Surgical Oncology Follow Up       [facility] SURGICAL ONCOLOG[facility] [address]    Raul Suh  1970  [id number]  [facility]  [address] [id number]    Chief Complaint   Patient presents with    Follow-up       Assessment/Plan   Diagnoses and all orders for this visit:    Dense breast tissue    Screening mammogram for high-risk patient    Increased risk of breast cancer    Family history of breast cancer    Breast cancer screening other than mammogram    Other orders  -     buPROPion (WELLBUTRIN SR) 150 mg 12 hr tablet; Take 150 mg by mouth 2 (two) times a day        Advance Care Planning/Advance Directives:  Did not discuss  with the patient  Oncology History:    Oncology History    No history exists  History of Present Illness: Follow-up visit secondary to dense breast tissue and family history breast cancer, no breast referable concerns, does have issues with menopausal symptoms  -Interval History: benign breast imaging    Review of Systems:  Review of Systems   Constitutional: Negative  Negative for appetite change and fever  Eyes: Negative  Respiratory: Negative for shortness of breath  Cardiovascular: Negative  Gastrointestinal: Negative  Endocrine: Negative  Genitourinary: Negative  Musculoskeletal: Negative  Negative for arthralgias and myalgias  Skin: Negative  Allergic/Immunologic: Negative  Neurological: Negative  Hematological: Negative  Negative for adenopathy  Does not bruise/bleed easily  Psychiatric/Behavioral: Negative          Patient Active Problem List   Diagnosis    Encounter for routine gynecological examination with Papanicolaou smear of cervix    Dense breast tissue    Family history of breast cancer    Increased risk of breast cancer    Screening mammogram for high-risk patient    Breast cancer screening other than mammogram    Acute non-recurrent frontal sinusitis    Vaginal candidiasis    Asthma    Environmental allergies     Past Medical History:   Diagnosis Date    Asthma     Constipation     Female pelvic pain     Headache     Hemorrhoid     Wears glasses      Past Surgical History:   Procedure Laterality Date    BUNIONECTOMY Right     CARPAL TUNNEL RELEASE Bilateral     performed a year apart more than 6 years ago    COLONOSCOPY      CYSTECTOMY       Family History   Problem Relation Age of Onset    Breast cancer Maternal Aunt 54    Breast cancer Maternal Aunt 61    Lung cancer Maternal Grandmother 67    Stomach cancer Maternal Aunt 67    No Known Problems Mother     No Known Problems Father     No Known Problems Daughter     No Known Problems Maternal Grandfather     No Known Problems Paternal Grandmother     No Known Problems Paternal Grandfather     No Known Problems Daughter     No Known Problems Paternal Aunt      Social History     Socioeconomic History    Marital status: Legally      Spouse name: Not on file    Number of children: Not on file    Years of education: Not on file    Highest education level: Not on file   Occupational History    Not on file   Tobacco Use    Smoking status: Current Some Day Smoker     Last attempt to quit: 2019     Years since quittin 6    Smokeless tobacco: Never Used   Vaping Use    Vaping Use: Never used   Substance and Sexual Activity    Alcohol use: Yes     Comment: 1-2 drinks per week    Drug use: No    Sexual activity: Yes     Partners: Male     Birth control/protection: Male Sterilization   Other Topics Concern    Not on file   Social History Narrative    Not on file     Social Determinants of Health     Financial Resource Strain:     Difficulty of Paying Living Expenses:    Food Insecurity:     Worried About Running Out of Food in the Last Year:     [address] in the Last Year: Transportation Needs:     Lack of Transportation (Medical):      Lack of Transportation (Non-Medical):    Physical Activity:     Days of Exercise per Week:     Minutes of Exercise per Session:    Stress:     Feeling of Stress :    Social Connections:     Frequency of Communication with Friends and Family:     Frequency of Social Gatherings with Friends and Family:     Attends Christianity Services:     Active Member of Clubs or Organizations:     Attends Club or Organization Meetings:     Marital Status:    Intimate Partner Violence:     Fear of Current or Ex-Partner:     Emotionally Abused:     Physically Abused:     Sexually Abused:        Current Outpatient Medications:     ALPRAZolam (XANAX) 0 25 mg tablet, Take 1 tab by mouth daily as needed for anxiety, Disp: , Rfl:     azelastine (ASTELIN) 0 1 % nasal spray, 1 spray into each nostril daily, Disp: 1 Bottle, Rfl: 0    budesonide-formoterol (Symbicort) 160-4 5 mcg/act inhaler, Symbicort 160 mcg-4 5 mcg/actuation HFA aerosol inhaler  INHALE 2 PUFFS TWO TIMES A DAY, Disp: , Rfl:     buPROPion (WELLBUTRIN SR) 150 mg 12 hr tablet, Take 150 mg by mouth 2 (two) times a day, Disp: , Rfl:     cetirizine (ZyrTEC) 10 mg tablet, Take 10 mg by mouth every 24 hours , Disp: , Rfl:     Loratadine 10 MG CAPS, Take by mouth, Disp: , Rfl:     olopatadine HCl (PATADAY) 0 2 % opth drops, Administer 1 drop to both eyes daily, Disp: 2 5 mL, Rfl: 0    brompheniramine-pseudoephedrine-DM 30-2-10 MG/5ML syrup, Take 10 mL by mouth 4 (four) times a day as needed for cough (Patient not taking: Reported on 6/24/2021), Disp: 120 mL, Rfl: 0    conjugated estrogens (PREMARIN) vaginal cream, Insert 1 g into the vagina daily (Patient not taking: Reported on 9/16/2021), Disp: 42 5 g, Rfl: 11    drospirenone-ethinyl estradiol (WILFREDO) 3-0 02 MG per tablet, Take 1 tablet by mouth daily (Patient not taking: Reported on 6/24/2021), Disp: 90 tablet, Rfl: 4    medroxyPROGESTERone (PROVERA) 10 mg tablet, Take 1 tablet (10 mg total) by mouth daily for 10 days (Patient not taking: Reported on 9/16/2021), Disp: 10 tablet, Rfl: 0    PARoxetine (PAXIL) 10 mg tablet, Take 1 tablet (10 mg total) by mouth daily (Patient not taking: Reported on 6/24/2021), Disp: 30 tablet, Rfl: 11    sertraline (ZOLOFT) 25 mg tablet, Take 25 mg by mouth (Patient not taking: Reported on 9/16/2021), Disp: , Rfl:   Allergies   Allergen Reactions    Methylprednisolone     Penicillins Hives     Last reaction 30 years ago    Prednisone      Other reaction(s): heart racing, jittery    Sulfa Antibiotics        The following portions of the patient's history were reviewed and updated as appropriate: allergies, current medications, past family history, past medical history, past social history, past surgical history and problem list         Vitals:    09/16/21 1518   BP: 98/70   Pulse: 87   Resp: 16   Temp: 99 6 °F (37 6 °C)   SpO2: 99%       Physical Exam  Constitutional:       General: She is not in acute distress  Appearance: She is well-developed  HENT:      Head: Normocephalic and atraumatic  Chest:      Breasts:         Right: No inverted nipple, mass, nipple discharge, skin change or tenderness  Left: No inverted nipple, mass, nipple discharge, skin change or tenderness  Lymphadenopathy:      Upper Body:      Right upper body: No supraclavicular, axillary or pectoral adenopathy  Left upper body: No supraclavicular, axillary or pectoral adenopathy  Neurological:      Mental Status: She is alert and oriented to person, place, and time  Psychiatric:         Mood and Affect: Mood normal            Results:  Labs:      Imaging   03/05/2021 bilateral 3D screening mammogram was benign BI-RADS one with a density of three   09/01/2021 automated breast ultrasound was benign    I reviewed the above imaging data      Discussion/Summary: 58-year-old female with dense breast tissue and family history of breast cancer  There are no concerns on examination today  I will continue ordering both the alternating mammogram and ultrasound for her  I will plan to see her again in one year or sooner should the need arise  I also made some recommendations for non hormonal treatment of her menopause symptoms

## 2021-10-05 ENCOUNTER — APPOINTMENT (OUTPATIENT)
Dept: URGENT CARE | Facility: CLINIC | Age: 51
End: 2021-10-05

## 2021-10-05 DIAGNOSIS — Z23 NEEDS FLU SHOT: Primary | ICD-10-CM

## 2021-11-04 ENCOUNTER — OFFICE VISIT (OUTPATIENT)
Dept: URGENT CARE | Facility: CLINIC | Age: 51
End: 2021-11-04

## 2021-11-04 VITALS
TEMPERATURE: 98.7 F | SYSTOLIC BLOOD PRESSURE: 126 MMHG | HEART RATE: 80 BPM | DIASTOLIC BLOOD PRESSURE: 80 MMHG | OXYGEN SATURATION: 99 % | RESPIRATION RATE: 16 BRPM

## 2021-11-04 DIAGNOSIS — J20.9 ACUTE BRONCHITIS, UNSPECIFIED ORGANISM: Primary | ICD-10-CM

## 2021-11-04 RX ORDER — BENZONATATE 100 MG/1
100 CAPSULE ORAL 3 TIMES DAILY PRN
Qty: 20 CAPSULE | Refills: 0 | Status: SHIPPED | OUTPATIENT
Start: 2021-11-04 | End: 2022-03-30

## 2021-11-04 RX ORDER — ALBUTEROL SULFATE 90 UG/1
2 AEROSOL, METERED RESPIRATORY (INHALATION) EVERY 6 HOURS PRN
COMMUNITY

## 2021-11-08 ENCOUNTER — TELEPHONE (OUTPATIENT)
Dept: URGENT CARE | Facility: CLINIC | Age: 51
End: 2021-11-08

## 2021-11-08 DIAGNOSIS — J20.9 ACUTE BRONCHITIS, UNSPECIFIED ORGANISM: Primary | ICD-10-CM

## 2022-02-28 ENCOUNTER — OFFICE VISIT (OUTPATIENT)
Dept: URGENT CARE | Facility: CLINIC | Age: 52
End: 2022-02-28

## 2022-02-28 VITALS
RESPIRATION RATE: 16 BRPM | OXYGEN SATURATION: 98 % | HEART RATE: 66 BPM | SYSTOLIC BLOOD PRESSURE: 120 MMHG | TEMPERATURE: 98.3 F | DIASTOLIC BLOOD PRESSURE: 76 MMHG

## 2022-02-28 DIAGNOSIS — N30.90 CYSTITIS: Primary | ICD-10-CM

## 2022-02-28 LAB
SL AMB  POCT GLUCOSE, UA: NEGATIVE
SL AMB LEUKOCYTE ESTERASE,UA: ABNORMAL
SL AMB POCT BILIRUBIN,UA: NEGATIVE
SL AMB POCT BLOOD,UA: ABNORMAL
SL AMB POCT CLARITY,UA: CLEAR
SL AMB POCT COLOR,UA: YELLOW
SL AMB POCT KETONES,UA: NEGATIVE
SL AMB POCT NITRITE,UA: NEGATIVE
SL AMB POCT PH,UA: 6
SL AMB POCT SPECIFIC GRAVITY,UA: 1.01
SL AMB POCT URINE PROTEIN: NEGATIVE
SL AMB POCT UROBILINOGEN: NEGATIVE

## 2022-02-28 PROCEDURE — 87086 URINE CULTURE/COLONY COUNT: CPT | Performed by: NURSE PRACTITIONER

## 2022-02-28 RX ORDER — NITROFURANTOIN 25; 75 MG/1; MG/1
100 CAPSULE ORAL 2 TIMES DAILY
Qty: 14 CAPSULE | Refills: 0 | Status: SHIPPED | OUTPATIENT
Start: 2022-02-28 | End: 2022-03-07

## 2022-02-28 NOTE — PROGRESS NOTES
[facility]        NAME: Jeannette Weeks is a 45 y o  female  : 1970    MRN: [id number]  DATE: 2022  TIME: 12:06 PM    Assessment and Plan   Cystitis [N30 90]  1  Cystitis  POCT urine dip    Urine culture    nitrofurantoin (MACROBID) 100 mg capsule         Patient Instructions       Take Macrobid as prescribed  Take with food  Increase water intake  Follow up with your PCP or return to the clinic if symptoms worsen or persist more than 2-3 days  Proceed to  ER if symptoms worsen  Chief Complaint     Chief Complaint   Patient presents with    Urinary Tract Infection         History of Present Illness       UTI symptoms 5 days  Suprapubic pressure, mild burning, urgency, frequency  No fever, chills, or flank pain  She had loose stools about 1 week ago  No vaginal symptoms  She has been drinking cranberry juice and increasing water intake- mild improvement  Review of Systems   Review of Systems   Constitutional: Negative  Negative for chills and fever  Respiratory: Negative  Cardiovascular: Negative  Gastrointestinal: Negative  Negative for abdominal pain  Genitourinary: Positive for dysuria, frequency and urgency  Negative for flank pain, hematuria, vaginal discharge and vaginal pain  Musculoskeletal: Negative  Skin: Negative  All other systems reviewed and are negative          Current Medications       Current Outpatient Medications:     albuterol (PROVENTIL HFA,VENTOLIN HFA) 90 mcg/act inhaler, Inhale 2 puffs every 6 (six) hours as needed for wheezing, Disp: , Rfl:     ALPRAZolam (XANAX) 0 25 mg tablet, Take 1 tab by mouth daily as needed for anxiety, Disp: , Rfl:     azelastine (ASTELIN) 0 1 % nasal spray, 1 spray into each nostril daily, Disp: 1 Bottle, Rfl: 0    budesonide-formoterol (Symbicort) 160-4 5 mcg/act inhaler, Symbicort 160 mcg-4 5 mcg/actuation HFA aerosol inhaler  INHALE 2 PUFFS TWO TIMES A DAY, Disp: , Rfl:     buPROPion [facility] SR) 150 mg 12 hr tablet, Take 150 mg by mouth 2 (two) times a day, Disp: , Rfl:     cetirizine (ZyrTEC) 10 mg tablet, Take 10 mg by mouth daily at bedtime prn , Disp: , Rfl:     Loratadine 10 MG CAPS, Take by mouth as needed  , Disp: , Rfl:     olopatadine HCl (PATADAY) 0 2 % opth drops, Administer 1 drop to both eyes daily, Disp: 2 5 mL, Rfl: 0    benzonatate (TESSALON PERLES) 100 mg capsule, Take 1 capsule (100 mg total) by mouth 3 (three) times a day as needed for cough (Patient not taking: Reported on 2/28/2022 ), Disp: 20 capsule, Rfl: 0    nitrofurantoin (MACROBID) 100 mg capsule, Take 1 capsule (100 mg total) by mouth 2 (two) times a day for 7 days, Disp: 14 capsule, Rfl: 0    Current Allergies     Allergies as of 02/28/2022 - Reviewed 02/28/2022   Allergen Reaction Noted    Methylprednisolone      Penicillins Hives 09/10/2012    Prednisone  04/13/2015    Sulfa antibiotics  04/13/2015            The following portions of the patient's history were reviewed and updated as appropriate: allergies, current medications, past family history, past medical history, past social history, past surgical history and problem list      Past Medical History:   Diagnosis Date    Asthma     Constipation     Female pelvic pain     Headache     Hemorrhoid     Wears glasses        Past Surgical History:   Procedure Laterality Date    BUNIONECTOMY Right 1999    CARPAL TUNNEL RELEASE Bilateral     performed a year apart more than 6 years ago    COLONOSCOPY  05/11/2018    1 polyp that was tubular adenoma    CYSTECTOMY      UPPER GASTROINTESTINAL ENDOSCOPY  11/28/2017    Mild gastritis-biopsy negative for H  pylori    Biopsy of duodenum and's normal, esophageal candidiasis positive biopsy by Dr Dorothea Tolbert       Family History   Problem Relation Age of Onset    Breast cancer Maternal Aunt 54    Breast cancer Maternal Aunt 61    Lung cancer Maternal Grandmother 67    Stomach cancer Maternal Aunt 67    Colon polyps Mother     Colon polyps Father     No Known Problems Daughter     No Known Problems Maternal Grandfather     No Known Problems Paternal Grandmother     No Known Problems Paternal Grandfather     No Known Problems Daughter     No Known Problems Paternal Aunt          Medications have been verified  Objective   /76 (BP Location: Right arm, Patient Position: Sitting, Cuff Size: Adult)   Pulse 66   Temp 98 3 °F (36 8 °C) (Tympanic)   Resp 16   SpO2 98%   No LMP recorded  Physical Exam     Physical Exam  Vitals reviewed  Constitutional:       Appearance: Normal appearance  She is well-developed  Cardiovascular:      Rate and Rhythm: Normal rate and regular rhythm  Pulses: Normal pulses  Pulmonary:      Effort: Pulmonary effort is normal       Breath sounds: Normal breath sounds  Abdominal:      General: Abdomen is flat  Bowel sounds are normal       Palpations: Abdomen is soft  There is no mass  Tenderness: There is abdominal tenderness (suprapubic pressure) in the suprapubic area  There is no right CVA tenderness, left CVA tenderness, guarding or rebound  Musculoskeletal:         General: Normal range of motion  Skin:     General: Skin is warm and dry  Capillary Refill: Capillary refill takes less than 2 seconds  Neurological:      General: No focal deficit present  Mental Status: She is alert and oriented to person, place, and time     Psychiatric:         Mood and Affect: Mood normal          Behavior: Behavior normal

## 2022-02-28 NOTE — PATIENT INSTRUCTIONS
Take Macrobid as prescribed  Take with food  Increase water intake  Follow up with your PCP or return to the clinic if symptoms worsen or persist more than 2-3 days  Urinary Tract Infection in Women   WHAT YOU NEED TO KNOW:   A urinary tract infection (UTI) is caused by bacteria that get inside your urinary tract  Most bacteria that enter your urinary tract come out when you urinate  If the bacteria stay in your urinary tract, you may get an infection  Your urinary tract includes your kidneys, ureters, bladder, and urethra  Urine is made in your kidneys, and it flows from the ureters to the bladder  Urine leaves the bladder through the urethra  A UTI is more common in your lower urinary tract, which includes your bladder and urethra  DISCHARGE INSTRUCTIONS:   Seek care immediately if:   · You are urinating very little or not at all  · You have a high fever with shaking chills  · You have side or back pain that gets worse  Call your doctor if:   · You have a fever  · You do not feel better after 2 days of taking antibiotics  · You are vomiting  · You have questions or concerns about your condition or care  Medicines:   · Antibiotics  help fight a bacterial infection  If you have UTIs often (called recurrent UTIs), you may be given antibiotics to take regularly  You will be given directions for when and how to use antibiotics  The goal is to prevent UTIs but not cause antibiotic resistance by using antibiotics too often  · Medicines  may be given to decrease pain and burning when you urinate  They will also help decrease the feeling that you need to urinate often  These medicines will make your urine orange or red  · Take your medicine as directed  Contact your healthcare provider if you think your medicine is not helping or if you have side effects  Tell him or her if you are allergic to any medicine  Keep a list of the medicines, vitamins, and herbs you take   Include the amounts, and when and why you take them  Bring the list or the pill bottles to follow-up visits  Carry your medicine list with you in case of an emergency  Follow up with your doctor as directed:  Write down your questions so you remember to ask them during your visits  Prevent another UTI:   · Empty your bladder often  Urinate and empty your bladder as soon as you feel the need  Do not hold your urine for long periods of time  · Wipe from front to back after you urinate or have a bowel movement  This will help prevent germs from getting into your urinary tract through your urethra  · Drink liquids as directed  Ask how much liquid to drink each day and which liquids are best for you  You may need to drink more liquids than usual to help flush out the bacteria  Do not drink alcohol, caffeine, or citrus juices  These can irritate your bladder and increase your symptoms  Your healthcare provider may recommend cranberry juice to help prevent a UTI  · Urinate after you have sex  This can help flush out bacteria passed during sex  · Do not douche or use feminine deodorants  These can change the chemical balance in your vagina  · Change sanitary pads or tampons often  This will help prevent germs from getting into your urinary tract  · Talk to your healthcare provider about your birth control method  You may need to change your method if it is increasing your risk for UTIs  · Wear cotton underwear and clothes that are loose  Tight pants and nylon underwear can trap moisture and cause bacteria to grow  · Vaginal estrogen may be recommended  This medicine helps prevent UTIs in women who have gone through menopause or are in missael-menopause  · Do pelvic muscle exercises often  Pelvic muscle exercises may help you start and stop urinating  Strong pelvic muscles may help you empty your bladder easier  Squeeze these muscles tightly for 5 seconds like you are trying to hold back urine  Then relax for 5 seconds  Gradually work up to squeezing for 10 seconds  Do 3 sets of 15 repetitions a day, or as directed  © Copyright [employer] 2022 Information is for End User's use only and may not be sold, redistributed or otherwise used for commercial purposes  All illustrations and images included in CareNotes® are the copyrighted property of [employer]  or [facility] [employer]   The above information is an  only  It is not intended as medical advice for individual conditions or treatments  Talk to your doctor, nurse or pharmacist before following any medical regimen to see if it is safe and effective for you

## 2022-02-28 NOTE — ASSESSMENT & PLAN NOTE
History and exam findings consistent with UTI  Urine dip revealed trace leukocytes & occult blood  Rx written for Macrobid, pt educated on appropriate administration  Urine culture sent to the lab  Reasons to follow up reviewed with pt

## 2022-03-02 LAB — BACTERIA UR CULT: NORMAL

## 2022-03-15 ENCOUNTER — HOSPITAL ENCOUNTER (OUTPATIENT)
Dept: MAMMOGRAPHY | Facility: MEDICAL CENTER | Age: 52
Discharge: HOME | End: 2022-03-15
Payer: COMMERCIAL

## 2022-03-15 VITALS — WEIGHT: 145.06 LBS | BODY MASS INDEX: 23.31 KG/M2 | HEIGHT: 66 IN

## 2022-03-15 DIAGNOSIS — Z12.31 SCREENING MAMMOGRAM FOR HIGH-RISK PATIENT: ICD-10-CM

## 2022-03-15 PROCEDURE — 77063 BREAST TOMOSYNTHESIS BI: CPT

## 2022-03-15 PROCEDURE — 77067 SCR MAMMO BI INCL CAD: CPT

## 2022-03-30 ENCOUNTER — ANNUAL EXAM (OUTPATIENT)
Dept: OBGYN CLINIC | Facility: MEDICAL CENTER | Age: 52
End: 2022-03-30
Payer: COMMERCIAL

## 2022-03-30 VITALS
WEIGHT: 145 LBS | HEIGHT: 66 IN | BODY MASS INDEX: 23.3 KG/M2 | SYSTOLIC BLOOD PRESSURE: 110 MMHG | DIASTOLIC BLOOD PRESSURE: 70 MMHG

## 2022-03-30 DIAGNOSIS — Z12.31 ENCOUNTER FOR SCREENING MAMMOGRAM FOR MALIGNANT NEOPLASM OF BREAST: ICD-10-CM

## 2022-03-30 DIAGNOSIS — Z01.419 ENCOUNTER FOR WELL WOMAN EXAM WITH ROUTINE GYNECOLOGICAL EXAM: Primary | ICD-10-CM

## 2022-03-30 DIAGNOSIS — N76.0 RECURRENT VAGINITIS: ICD-10-CM

## 2022-03-30 PROCEDURE — S0612 ANNUAL GYNECOLOGICAL EXAMINA: HCPCS | Performed by: OBSTETRICS & GYNECOLOGY

## 2022-03-30 PROCEDURE — G0145 SCR C/V CYTO,THINLAYER,RESCR: HCPCS | Performed by: OBSTETRICS & GYNECOLOGY

## 2022-03-30 PROCEDURE — G0476 HPV COMBO ASSAY CA SCREEN: HCPCS | Performed by: OBSTETRICS & GYNECOLOGY

## 2022-03-30 RX ORDER — FLUCONAZOLE 150 MG/1
150 TABLET ORAL ONCE
Qty: 1 TABLET | Refills: 6 | Status: SHIPPED | OUTPATIENT
Start: 2022-03-30 | End: 2022-03-30

## 2022-03-30 NOTE — PROGRESS NOTES
ASSESSMENT & PLAN: Sherie Barnes is a 45 y o  [id number] with normal gynecologic exam     1   Routine well woman exam done today  2  Pap and HPV:  The patient's last pap and hpv was   It was normal   Pap with reflex cotesting was done today  Current ASCCP Guidelines reviewed  Requests every 2 years  3  Mammogram followed by Dr Ardon  4  Colorectal cancer screening was not ordered  Done   5  The following were reviewed in today's visit: breast self exam and  screening     CC: Annual Gynecologic Examination    HPI: Sherie Barnes is a 45 y o  [id number] who presents for annual gynecologic examination  She has the following concerns:  Doing well, still getting yeast infections  Health Maintenance:    She wears her seatbelt routinely  She does perform regular monthly self breast exams  She feels safe at home  Pap:   Last mammogram:   Last colonoscopy:     Past Medical History:   Diagnosis Date    Asthma     Constipation     Female pelvic pain     Headache     Hemorrhoid     Wears glasses        Past Surgical History:   Procedure Laterality Date    BUNIONECTOMY Right     CARPAL TUNNEL RELEASE Bilateral     performed a year apart more than 6 years ago    COLONOSCOPY  2018    1 polyp that was tubular adenoma    CYSTECTOMY      UPPER GASTROINTESTINAL ENDOSCOPY  2017    Mild gastritis-biopsy negative for H  pylori  Biopsy of duodenum and's normal, esophageal candidiasis positive biopsy by Dr Elenore Hashimoto       Past OB/Gyn History:  OB History        4    Para   2    Term   2            AB   2    Living   2       SAB   2    IAB        Ectopic        Multiple        Live Births   2           Obstetric Comments   Menarche : 11  Hx of BCP and Depo provera              Pt does not have menstrual issues  regular   History of sexually transmitted infection: No   History of abnormal pap smears: No      Patient is currently sexually active        Family History Problem Relation Age of Onset    Breast cancer Maternal Aunt 54    Breast cancer Maternal Aunt 61    Lung cancer Maternal Grandmother 67    Stomach cancer Maternal Aunt 67    Colon polyps Mother     Colon polyps Father     No Known Problems Daughter     No Known Problems Maternal Grandfather     No Known Problems Paternal Grandmother     No Known Problems Paternal Grandfather     No Known Problems Daughter     No Known Problems Paternal Aunt        Social History:  Social History     Socioeconomic History    Marital status: Legally      Spouse name: Not on file    Number of children: Not on file    Years of education: Not on file    Highest education level: Not on file   Occupational History    Occupation: [employer]   Tobacco Use    Smoking status: Current Every Day Smoker     Types: Cigarettes    Smokeless tobacco: Never Used    Tobacco comment: 1/2 pack daily   Vaping Use    Vaping Use: Never used   Substance and Sexual Activity    Alcohol use: Yes     Comment: 1-2 drinks per week    Drug use: No    Sexual activity: Yes     Partners: Male     Birth control/protection: Male Sterilization   Other Topics Concern    Not on file   Social History Narrative    Not on file     Social Determinants of Health     Financial Resource Strain: Not on file   Food Insecurity: Not on file   Transportation Needs: Not on file   Physical Activity: Not on file   Stress: Not on file   Social Connections: Not on file   Intimate Partner Violence: Not on file   Housing Stability: Not on file     Allergies   Allergen Reactions    Methylprednisolone     Penicillins Hives     Last reaction 30 years ago    Prednisone      Other reaction(s): heart racing, jittery    Sulfa Antibiotics        Current Outpatient Medications:     albuterol (PROVENTIL HFA,VENTOLIN HFA) 90 mcg/act inhaler, Inhale 2 puffs every 6 (six) hours as needed for wheezing, Disp: , Rfl:     ALPRAZolam (XANAX) 0 25 mg tablet, Take 1 tab by mouth daily as needed for anxiety, Disp: , Rfl:     azelastine (ASTELIN) 0 1 % nasal spray, 1 spray into each nostril daily, Disp: 1 Bottle, Rfl: 0    budesonide-formoterol (Symbicort) 160-4 5 mcg/act inhaler, Symbicort 160 mcg-4 5 mcg/actuation HFA aerosol inhaler  INHALE 2 PUFFS TWO TIMES A DAY, Disp: , Rfl:     buPROPion (WELLBUTRIN SR) 150 mg 12 hr tablet, Take 150 mg by mouth 2 (two) times a day, Disp: , Rfl:     cetirizine (ZyrTEC) 10 mg tablet, Take 10 mg by mouth daily at bedtime prn , Disp: , Rfl:     Loratadine 10 MG CAPS, Take by mouth as needed  , Disp: , Rfl:     olopatadine HCl (PATADAY) 0 2 % opth drops, Administer 1 drop to both eyes daily, Disp: 2 5 mL, Rfl: 0    fluconazole (DIFLUCAN) 150 mg tablet, Take 1 tablet (150 mg total) by mouth once for 1 dose, Disp: 1 tablet, Rfl: 6      Review of Systems  Constitutional :no fever, feels well, no tiredness, no recent weight gain or loss  ENT: no ear ache, no loss of hearing, no nosebleeds or nasal discharge, no sore throat or hoarseness  Cardiovascular: no complaints of slow or fast heart beat, no chest pain, no palpitations, no leg claudication or lower extremity edema  Respiratory: no complaints of shortness of shortness of breath, no SINGLETON  Breasts:no complaints of breast pain, breast lump, or nipple discharge  Gastrointestinal: no complaints of abdominal pain, constipation, nausea, vomiting, or diarrhea or bloody stools  Genitourinary : no complaints of dysuria, incontinence, pelvic pain, no dysmenorrhea, vaginal discharge or abnormal vaginal bleeding and as noted in HPI  Musculoskeletal: no complaints of arthralgia, no myalgia, no joint swelling or stiffness, no limb pain or swelling    Integumentary: no complaints of skin rash or lesion, itching or dry skin  Neurological: no complaints of headache, no confusion, no numbness or tingling, no dizziness or fainting    Objective      /70 (BP Location: Right arm, Patient Position: Sitting, Cuff Size: Adult)   Ht 5' 6" (1 676 m)   Wt 65 8 kg (145 lb)   LMP 03/15/2022   BMI 23 40 kg/m²     General:   appears stated age, cooperative, alert normal mood and affect   Neck: normal, supple,trachea midline, no masses   Heart: regular rate and rhythm, S1, S2 normal, no murmur, click, rub or gallop   Lungs: clear to auscultation bilaterally   Breasts: normal appearance, no masses or tenderness, Inspection negative, No nipple retraction or dimpling, No nipple discharge or bleeding, No axillary or supraclavicular adenopathy, Normal to palpation without dominant masses   Abdomen: soft, non-tender, without masses or organomegaly   Vulva: normal female genitalia, Bartholin's, Urethra, [place] normal, no lesions, normal female hair distribution   Vagina: normal vagina, no discharge, exudate, lesion, or erythema   Urethra: normal   Cervix: Normal, no discharge   Uterus: normal size, contour, position, consistency, mobility, non-tender   Adnexa: normal adnexa and no mass, fullness, tenderness   Lymphatic palpation of lymph nodes in neck, axilla, groin and/or other locations: no lymphadenopathy or masses noted   Skin normal skin turgor and no rashes     Psychiatric orientation to person, place, and time: normal  mood and affect: normal

## 2022-03-31 LAB
HPV HR 12 DNA CVX QL NAA+PROBE: NEGATIVE
HPV16 DNA CVX QL NAA+PROBE: NEGATIVE
HPV18 DNA CVX QL NAA+PROBE: NEGATIVE

## 2022-04-08 LAB
LAB AP GYN PRIMARY INTERPRETATION: NORMAL
Lab: NORMAL

## 2022-04-25 ENCOUNTER — OFFICE VISIT (OUTPATIENT)
Dept: URGENT CARE | Facility: CLINIC | Age: 52
End: 2022-04-25

## 2022-04-25 VITALS
TEMPERATURE: 97.6 F | HEART RATE: 72 BPM | SYSTOLIC BLOOD PRESSURE: 118 MMHG | OXYGEN SATURATION: 98 % | DIASTOLIC BLOOD PRESSURE: 78 MMHG | RESPIRATION RATE: 16 BRPM

## 2022-04-25 DIAGNOSIS — B37.3 VAGINAL CANDIDIASIS: ICD-10-CM

## 2022-04-25 DIAGNOSIS — J01.10 ACUTE NON-RECURRENT FRONTAL SINUSITIS: Primary | ICD-10-CM

## 2022-04-25 RX ORDER — FLUCONAZOLE 150 MG/1
150 TABLET ORAL ONCE
Qty: 1 TABLET | Refills: 0 | Status: SHIPPED | OUTPATIENT
Start: 2022-04-25 | End: 2022-04-25

## 2022-04-25 RX ORDER — DOXYCYCLINE 100 MG/1
100 TABLET ORAL 2 TIMES DAILY
Qty: 14 TABLET | Refills: 0 | Status: SHIPPED | OUTPATIENT
Start: 2022-04-25 | End: 2022-05-02

## 2022-04-25 RX ORDER — AZELASTINE 1 MG/ML
1 SPRAY, METERED NASAL DAILY
Qty: 30 ML | Refills: 0 | Status: SHIPPED | OUTPATIENT
Start: 2022-04-25

## 2022-04-25 NOTE — PROGRESS NOTES
[facility]        NAME: Cayetano Zee is a 45 y o  female  : 1970    MRN: [id number]  DATE: 2022  TIME: 11:12 AM    Assessment and Plan   Acute non-recurrent frontal sinusitis [J01 10]  1  Acute non-recurrent frontal sinusitis  doxycycline (ADOXA) 100 MG tablet    azelastine (ASTELIN) 0 1 % nasal spray   2  Vaginal candidiasis  fluconazole (DIFLUCAN) 150 mg tablet         Patient Instructions       Take a COVID test today  If negative, start antibiotics  Take Doxycycline as prescribed, take with food  Wear sunscreen when outside  Continue Astelin nasal spray daily  Increase your water intake and use nasal saline rinses  Recommend Mucinex as needed  Follow up with your PCP or return to the clinic if symptoms worsen or persist more than 3-5 days  Proceed to  ER if symptoms worsen  Chief Complaint     Chief Complaint   Patient presents with    Sinusitis         History of Present Illness       Sinus pressure/ congestion, b/l ear pressure, post-nasal drip, sore throat, dry cough, purulent nasal discharge x 1 week  Congestion worse on left  Symptoms are worsening with time  She tried taking nasal decongestant, ibuprofen, and cold/sinus pill- mild improvement  Her daughter is also sick with similar symptoms  No fevers or chills  She had her COVID vaccines, has not gotten her booster yet  She has not taken a COVID test recently  Review of Systems   Review of Systems   Constitutional: Negative  Negative for chills and fever  HENT: Positive for congestion, postnasal drip, rhinorrhea, sinus pressure (worse on left around eyes), sinus pain and sore throat  Negative for ear discharge, ear pain (b/l ear pressure, worse on left), hearing loss and trouble swallowing  Eyes: Negative  Respiratory: Positive for cough (dry)  Negative for shortness of breath and wheezing  Cardiovascular: Negative  Negative for chest pain and palpitations  Skin: Negative  Allergic/Immunologic: Positive for environmental allergies  All other systems reviewed and are negative          Current Medications       Current Outpatient Medications:     albuterol (PROVENTIL HFA,VENTOLIN HFA) 90 mcg/act inhaler, Inhale 2 puffs every 6 (six) hours as needed for wheezing, Disp: , Rfl:     ALPRAZolam (XANAX) 0 25 mg tablet, Take 1 tab by mouth daily as needed for anxiety, Disp: , Rfl:     azelastine (ASTELIN) 0 1 % nasal spray, 1 spray into each nostril daily, Disp: 30 mL, Rfl: 0    budesonide-formoterol (Symbicort) 160-4 5 mcg/act inhaler, Symbicort 160 mcg-4 5 mcg/actuation HFA aerosol inhaler  INHALE 2 PUFFS TWO TIMES A DAY, Disp: , Rfl:     buPROPion (WELLBUTRIN SR) 150 mg 12 hr tablet, Take 150 mg by mouth 2 (two) times a day, Disp: , Rfl:     cetirizine (ZyrTEC) 10 mg tablet, Take 10 mg by mouth daily at bedtime prn , Disp: , Rfl:     Loratadine 10 MG CAPS, Take by mouth as needed  , Disp: , Rfl:     olopatadine HCl (PATADAY) 0 2 % opth drops, Administer 1 drop to both eyes daily, Disp: 2 5 mL, Rfl: 0    doxycycline (ADOXA) 100 MG tablet, Take 1 tablet (100 mg total) by mouth 2 (two) times a day for 7 days, Disp: 14 tablet, Rfl: 0    fluconazole (DIFLUCAN) 150 mg tablet, Take 1 tablet (150 mg total) by mouth once for 1 dose, Disp: 1 tablet, Rfl: 0    Current Allergies     Allergies as of 04/25/2022 - Reviewed 04/25/2022   Allergen Reaction Noted    Methylprednisolone      Penicillins Hives 09/10/2012    Prednisone  04/13/2015    Sulfa antibiotics  04/13/2015            The following portions of the patient's history were reviewed and updated as appropriate: allergies, current medications, past family history, past medical history, past social history, past surgical history and problem list      Past Medical History:   Diagnosis Date    Asthma     Constipation     Female pelvic pain     Headache     Hemorrhoid     Wears glasses        Past Surgical History:   Procedure Laterality Date    BUNIONECTOMY Right 1999    CARPAL TUNNEL RELEASE Bilateral     performed a year apart more than 6 years ago    COLONOSCOPY  05/11/2018    1 polyp that was tubular adenoma    CYSTECTOMY      UPPER GASTROINTESTINAL ENDOSCOPY  11/28/2017    Mild gastritis-biopsy negative for H  pylori  Biopsy of duodenum and's normal, esophageal candidiasis positive biopsy by Dr Maru Serna       Family History   Problem Relation Age of Onset    Breast cancer Maternal Aunt 54    Breast cancer Maternal Aunt 61    Lung cancer Maternal Grandmother 67    Stomach cancer Maternal Aunt 67    Colon polyps Mother     Colon polyps Father     No Known Problems Daughter     No Known Problems Maternal Grandfather     No Known Problems Paternal Grandmother     No Known Problems Paternal Grandfather     No Known Problems Daughter     No Known Problems Paternal Aunt          Medications have been verified  Objective   /78 (BP Location: Right arm, Patient Position: Sitting, Cuff Size: Adult)   Pulse 72   Temp 97 6 °F (36 4 °C) (Tympanic)   Resp 16   SpO2 98%   No LMP recorded  Physical Exam     Physical Exam  Vitals and nursing note reviewed  Constitutional:       General: She is not in acute distress  Appearance: Normal appearance  She is well-developed  HENT:      Head: Normocephalic and atraumatic  Jaw: There is normal jaw occlusion  Right Ear: Hearing, ear canal and external ear normal  A middle ear effusion is present  Left Ear: Hearing, ear canal and external ear normal  A middle ear effusion is present  Nose: Mucosal edema, congestion and rhinorrhea present  Rhinorrhea is purulent  Right Turbinates: Swollen  Left Turbinates: Swollen  Right Sinus: Frontal sinus tenderness present  Left Sinus: Frontal sinus tenderness present  Mouth/Throat:      Lips: Pink  Mouth: Mucous membranes are moist       Dentition: Normal dentition  Pharynx: Uvula midline  Posterior oropharyngeal erythema present  No oropharyngeal exudate  Tonsils: No tonsillar exudate  1+ on the right  1+ on the left  Eyes:      Conjunctiva/sclera: Conjunctivae normal       Pupils: Pupils are equal, round, and reactive to light  Cardiovascular:      Rate and Rhythm: Normal rate and regular rhythm  Pulses: Normal pulses  Heart sounds: Normal heart sounds  Pulmonary:      Effort: Pulmonary effort is normal       Breath sounds: Normal breath sounds  Musculoskeletal:         General: Normal range of motion  Cervical back: Normal range of motion and neck supple  Skin:     General: Skin is warm and dry  Capillary Refill: Capillary refill takes less than 2 seconds  Neurological:      General: No focal deficit present  Mental Status: She is alert and oriented to person, place, and time     Psychiatric:         Mood and Affect: Mood normal          Behavior: Behavior normal

## 2022-04-25 NOTE — ASSESSMENT & PLAN NOTE
History and exam findings consistent with sinusitis  Pt agrees to take an at home COVID test today as recommended  Rx written for doxycycline & refill provided for Astelin nasal spray  Symptomatic care also recommended  Reasons to follow up reviewed with pt  All questions answered

## 2022-04-25 NOTE — PATIENT INSTRUCTIONS
Take a COVID test today  If negative, start antibiotics  Take Doxycycline as prescribed, take with food  Wear sunscreen when outside  Continue Astelin nasal spray daily  Increase your water intake and use nasal saline rinses  Recommend Mucinex as needed  Follow up with your PCP or return to the clinic if symptoms worsen or persist more than 3-5 days  Sinusitis, Ambulatory Care   GENERAL INFORMATION:   Sinusitis  is inflammation or infection of your sinuses  It is most often caused by a virus  Acute sinusitis may last up to 12 weeks  Chronic sinusitis lasts longer than 12 weeks  Recurrent sinusitis is when you have 3 or more episodes of sinusitis in 1 year  Common symptoms include the following:   · Fever    · Pain, pressure, redness, or swelling around the forehead, cheeks, or eyes    · Thick yellow or green discharge from your nose    · Tenderness when you touch your face over your sinuses    · Dry cough that happens mostly at night or when you lie down    · Headache and face pain that is worse when you lean forward    · Teeth pain or pain when you chew  Seek immediate care for the following symptoms:   · Vision changes such as double vision    · Confusion or trouble thinking clearly    · Headache and stiff neck    · Trouble breathing  Treatment for sinusitis  may include medicines to relieve nasal and sinus congestion or to decrease pain and fever  Ask your healthcare provider which medicines you should take and how much is safe  Manage sinusitis:   · Drink liquids as directed  Ask your healthcare provider how much liquid to drink each day and which liquids are best for you  Liquids will help loosen and drain the mucus in your sinuses  · Breathe in steam   Heat a bowl of water until you see steam  Lean over the bowl and make a tent over your head with a large towel  Breathe deeply for about 20 minutes  Be careful not to get too close to the steam or burn yourself  Do this 3 times a day   You can also breathe deeply when you take a hot shower  · Rinse your sinuses  Use a sinus rinse device to rinse your nasal passages with a saline (salt water) solution  This will help thin the mucus in your nose and rinse away pollen and dirt  It will also help reduce swelling so you can breathe normally  Ask how often to do this  · Use heat on your sinuses  to decrease pain  Apply heat for 15 to 20 minutes every hour for as many days as directed  · Sleep with your head elevated  Place an extra pillow under your head before you go to sleep to help your sinuses drain  · Do not smoke and avoid secondhand smoke  If you smoke, it is never too late to quit  Ask for information about how to stop smoking if you need help  Prevent the spread of germs that cause sinusitis:  Wash your hands often with soap and water  Wash your hands after you use the bathroom, change a child's diaper, or sneeze  Wash your hands before you prepare or eat food  Follow up with your healthcare provider as directed:  Write down your questions so you remember to ask them during your visits  CARE AGREEMENT:   You have the right to help plan your care  Learn about your health condition and how it may be treated  Discuss treatment options with your caregivers to decide what care you want to receive  You always have the right to refuse treatment  The above information is an  only  It is not intended as medical advice for individual conditions or treatments  Talk to your doctor, nurse or pharmacist before following any medical regimen to see if it is safe and effective for you  © 2014 [address] is for End User's use only and may not be sold, redistributed or otherwise used for commercial purposes  All illustrations and images included in CareNotes® are the copyrighted property of [employer]  or Paul Reinoso

## 2022-06-02 ENCOUNTER — OFFICE VISIT (OUTPATIENT)
Dept: URGENT CARE | Facility: CLINIC | Age: 52
End: 2022-06-02

## 2022-06-02 VITALS
SYSTOLIC BLOOD PRESSURE: 120 MMHG | DIASTOLIC BLOOD PRESSURE: 76 MMHG | HEART RATE: 80 BPM | RESPIRATION RATE: 16 BRPM | TEMPERATURE: 98.8 F | OXYGEN SATURATION: 98 %

## 2022-06-02 DIAGNOSIS — H65.01 NON-RECURRENT ACUTE SEROUS OTITIS MEDIA OF RIGHT EAR: Primary | ICD-10-CM

## 2022-06-02 RX ORDER — AZITHROMYCIN 250 MG/1
TABLET, FILM COATED ORAL
Qty: 6 TABLET | Refills: 0 | Status: SHIPPED | OUTPATIENT
Start: 2022-06-02 | End: 2022-06-06

## 2022-06-02 NOTE — ASSESSMENT & PLAN NOTE
History and exam findings consistent with AOM  Rx written for Gonzalo, hx allergy to Penicillin  Symptomatic care also recommended  Reasons to follow up discussed with pt  All questions answered

## 2022-06-02 NOTE — PATIENT INSTRUCTIONS
Take Z-sharath as prescribed  Take tylenol or Motrin as needed for pain  Follow up with your PCP or return to the clinic if symptoms worsen or persist more than 3-5 days

## 2022-06-02 NOTE — PROGRESS NOTES
[facility]        NAME: Angela Gardner is a 45 y o  female  : 1970    MRN: [id number]  DATE: 2022  TIME: 12:01 PM    Assessment and Plan   Non-recurrent acute serous otitis media of right ear [H65 01]  1  Non-recurrent acute serous otitis media of right ear  azithromycin (ZITHROMAX) 250 mg tablet         Patient Instructions       Take Z-sharath as prescribed  Take tylenol or Motrin as needed for pain  Follow up with your PCP or return to the clinic if symptoms worsen or persist more than 3-5 days  Proceed to  ER if symptoms worsen  Chief Complaint     Chief Complaint   Patient presents with    Earache     Right          History of Present Illness       R ear pain x 2 days  Symptoms started with sore throat as well, now resolved  Pain in R ear with swallowing  No fever or chills  She tried Ibuprofen- mild improvement  Pain is severe  No discharge or recent swimming  Review of Systems   Review of Systems   Constitutional: Negative  Negative for chills and fever  HENT: Positive for ear pain (right) and sore throat  Negative for congestion, ear discharge, hearing loss, postnasal drip, rhinorrhea, sinus pressure, sinus pain and trouble swallowing  Eyes: Negative  Respiratory: Negative  Negative for cough, shortness of breath and wheezing  Cardiovascular: Negative  Negative for chest pain and palpitations  Musculoskeletal: Negative  Skin: Negative  Allergic/Immunologic: Positive for environmental allergies  All other systems reviewed and are negative          Current Medications       Current Outpatient Medications:     albuterol (PROVENTIL HFA,VENTOLIN HFA) 90 mcg/act inhaler, Inhale 2 puffs every 6 (six) hours as needed for wheezing, Disp: , Rfl:     ALPRAZolam (XANAX) 0 25 mg tablet, Take 1 tab by mouth daily as needed for anxiety, Disp: , Rfl:     azelastine (ASTELIN) 0 1 % nasal spray, 1 spray into each nostril daily, Disp: 30 mL, Rfl: 0   azithromycin (ZITHROMAX) 250 mg tablet, Take 2 tablets today then 1 tablet daily x 4 days, Disp: 6 tablet, Rfl: 0    budesonide-formoterol (SYMBICORT) 160-4 5 mcg/act inhaler, Symbicort 160 mcg-4 5 mcg/actuation HFA aerosol inhaler  INHALE 2 PUFFS TWO TIMES A DAY, Disp: , Rfl:     buPROPion (WELLBUTRIN SR) 150 mg 12 hr tablet, Take 150 mg by mouth 2 (two) times a day, Disp: , Rfl:     cetirizine (ZyrTEC) 10 mg tablet, Take 10 mg by mouth daily at bedtime prn , Disp: , Rfl:     olopatadine HCl (PATADAY) 0 2 % opth drops, Administer 1 drop to both eyes daily, Disp: 2 5 mL, Rfl: 0    Loratadine 10 MG CAPS, Take by mouth as needed   (Patient not taking: Reported on 6/2/2022), Disp: , Rfl:     Current Allergies     Allergies as of 06/02/2022 - Reviewed 06/02/2022   Allergen Reaction Noted    Methylprednisolone      Penicillins Hives 09/10/2012    Prednisone  04/13/2015    Sulfa antibiotics  04/13/2015            The following portions of the patient's history were reviewed and updated as appropriate: allergies, current medications, past family history, past medical history, past social history, past surgical history and problem list      Past Medical History:   Diagnosis Date    Asthma     Constipation     Female pelvic pain     Headache     Hemorrhoid     Wears glasses        Past Surgical History:   Procedure Laterality Date    BUNIONECTOMY Right 1999    CARPAL TUNNEL RELEASE Bilateral     performed a year apart more than 6 years ago    COLONOSCOPY  05/11/2018    1 polyp that was tubular adenoma    CYSTECTOMY      UPPER GASTROINTESTINAL ENDOSCOPY  11/28/2017    Mild gastritis-biopsy negative for H  pylori    Biopsy of duodenum and's normal, esophageal candidiasis positive biopsy by Dr Yareli Corbin       Family History   Problem Relation Age of Onset    Breast cancer Maternal Aunt 54    Breast cancer Maternal Aunt 61    Lung cancer Maternal Grandmother 67    Stomach cancer Maternal Aunt 67    Colon polyps Mother     Colon polyps Father     No Known Problems Daughter     No Known Problems Maternal Grandfather     No Known Problems Paternal Grandmother     No Known Problems Paternal Grandfather     No Known Problems Daughter     No Known Problems Paternal Aunt          Medications have been verified  Objective   /76 (BP Location: Right arm, Patient Position: Sitting, Cuff Size: Adult)   Pulse 80   Temp 98 8 °F (37 1 °C) (Tympanic)   Resp 16   SpO2 98%   No LMP recorded  Physical Exam     Physical Exam  Vitals reviewed  Constitutional:       Appearance: Normal appearance  She is well-developed  HENT:      Head: Normocephalic and atraumatic  Jaw: There is normal jaw occlusion  Right Ear: Hearing, ear canal and external ear normal  No decreased hearing noted  Tenderness present  No drainage or swelling  No middle ear effusion  No mastoid tenderness  Tympanic membrane is bulging  Tympanic membrane is not perforated or erythematous  Left Ear: Hearing, tympanic membrane, ear canal and external ear normal  No drainage, swelling or tenderness  No middle ear effusion  No mastoid tenderness  Tympanic membrane is not perforated, erythematous or bulging  Nose: Nose normal       Right Turbinates: Not swollen  Left Turbinates: Not swollen  Right Sinus: No maxillary sinus tenderness or frontal sinus tenderness  Left Sinus: No maxillary sinus tenderness or frontal sinus tenderness  Mouth/Throat:      Lips: Pink  Mouth: Mucous membranes are moist       Dentition: Normal dentition  Pharynx: Oropharynx is clear  Uvula midline  Tonsils: No tonsillar exudate  1+ on the right  1+ on the left  Cardiovascular:      Rate and Rhythm: Normal rate  Pulmonary:      Effort: Pulmonary effort is normal    Musculoskeletal:         General: Normal range of motion  Lymphadenopathy:      Head:      Right side of head: Submandibular adenopathy present  No posterior auricular adenopathy  Left side of head: No submandibular or posterior auricular adenopathy  Skin:     General: Skin is warm and dry  Capillary Refill: Capillary refill takes less than 2 seconds  Neurological:      General: No focal deficit present  Mental Status: She is alert and oriented to person, place, and time     Psychiatric:         Mood and Affect: Mood normal          Behavior: Behavior normal

## 2022-06-28 ENCOUNTER — OFFICE VISIT (OUTPATIENT)
Dept: OBGYN CLINIC | Facility: MEDICAL CENTER | Age: 52
End: 2022-06-28
Payer: COMMERCIAL

## 2022-06-28 VITALS
DIASTOLIC BLOOD PRESSURE: 70 MMHG | SYSTOLIC BLOOD PRESSURE: 120 MMHG | WEIGHT: 144 LBS | BODY MASS INDEX: 23.14 KG/M2 | HEIGHT: 66 IN

## 2022-06-28 DIAGNOSIS — N76.0 RECURRENT VAGINITIS: ICD-10-CM

## 2022-06-28 DIAGNOSIS — D25.9 UTERINE LEIOMYOMA, UNSPECIFIED LOCATION: ICD-10-CM

## 2022-06-28 DIAGNOSIS — R10.2 PELVIC PAIN: Primary | ICD-10-CM

## 2022-06-28 DIAGNOSIS — T36.95XA ANTIBIOTIC-INDUCED YEAST INFECTION: ICD-10-CM

## 2022-06-28 DIAGNOSIS — B37.9 ANTIBIOTIC-INDUCED YEAST INFECTION: ICD-10-CM

## 2022-06-28 DIAGNOSIS — B96.89 BACTERIAL VAGINOSIS: ICD-10-CM

## 2022-06-28 DIAGNOSIS — N76.0 BACTERIAL VAGINOSIS: ICD-10-CM

## 2022-06-28 DIAGNOSIS — N93.9 ABNORMAL UTERINE BLEEDING (AUB): ICD-10-CM

## 2022-06-28 PROCEDURE — 87491 CHLMYD TRACH DNA AMP PROBE: CPT | Performed by: OBSTETRICS & GYNECOLOGY

## 2022-06-28 PROCEDURE — 87591 N.GONORRHOEAE DNA AMP PROB: CPT | Performed by: OBSTETRICS & GYNECOLOGY

## 2022-06-28 PROCEDURE — 99214 OFFICE O/P EST MOD 30 MIN: CPT | Performed by: OBSTETRICS & GYNECOLOGY

## 2022-06-28 RX ORDER — FLUCONAZOLE 150 MG/1
150 TABLET ORAL ONCE
Qty: 1 TABLET | Refills: 0 | Status: SHIPPED | OUTPATIENT
Start: 2022-06-28 | End: 2022-06-28

## 2022-06-28 RX ORDER — METRONIDAZOLE 7.5 MG/G
1 GEL VAGINAL
Qty: 70 G | Refills: 0 | Status: SHIPPED | OUTPATIENT
Start: 2022-06-28 | End: 2022-07-03

## 2022-06-29 ENCOUNTER — HOSPITAL ENCOUNTER (OUTPATIENT)
Dept: RADIOLOGY | Facility: HOSPITAL | Age: 52
Discharge: HOME | End: 2022-06-29
Attending: OBSTETRICS & GYNECOLOGY
Payer: COMMERCIAL

## 2022-06-29 DIAGNOSIS — R10.2 PELVIC PAIN: ICD-10-CM

## 2022-06-29 DIAGNOSIS — D25.9 UTERINE LEIOMYOMA, UNSPECIFIED LOCATION: ICD-10-CM

## 2022-06-29 LAB
C TRACH DNA SPEC QL NAA+PROBE: NEGATIVE
N GONORRHOEA DNA SPEC QL NAA+PROBE: NEGATIVE

## 2022-06-29 PROCEDURE — 76856 US EXAM PELVIC COMPLETE: CPT

## 2022-06-29 PROCEDURE — 76830 TRANSVAGINAL US NON-OB: CPT

## 2022-07-12 ENCOUNTER — OFFICE VISIT (OUTPATIENT)
Dept: OBGYN CLINIC | Facility: MEDICAL CENTER | Age: 52
End: 2022-07-12
Payer: COMMERCIAL

## 2022-07-12 VITALS
WEIGHT: 147 LBS | SYSTOLIC BLOOD PRESSURE: 120 MMHG | HEIGHT: 66 IN | BODY MASS INDEX: 23.63 KG/M2 | DIASTOLIC BLOOD PRESSURE: 84 MMHG

## 2022-07-12 DIAGNOSIS — R10.2 PELVIC PAIN: ICD-10-CM

## 2022-07-12 DIAGNOSIS — N93.9 ABNORMAL UTERINE BLEEDING (AUB): ICD-10-CM

## 2022-07-12 DIAGNOSIS — D25.9 UTERINE LEIOMYOMA, UNSPECIFIED LOCATION: Primary | ICD-10-CM

## 2022-07-12 PROCEDURE — 99213 OFFICE O/P EST LOW 20 MIN: CPT | Performed by: OBSTETRICS & GYNECOLOGY

## 2022-07-12 NOTE — PROGRESS NOTES
Assessment:  45 y o  [id number] who presents for US review  Plan:  Diagnoses and all orders for this visit:    Uterine leiomyoma, unspecified location  - Reviewed; unclear if sole etiology of symptoms    Pelvic pain  Abnormal uterine bleeding (AUB)  - Lab eval pending        __________________________________________________________________    Subjective   Clearence Florencia is a 45 y o  [id number] who presents as a follow up pelvic pain  Has not yet completed blood work  Needs to go on a Sat and has scheduling difficulty recently  Reviewed findings with pt  Unchanged fibroid noted; no other acute findings  Discussed fibroids in detail  Small size makes it unclear if sole cause of symptoms  psb related to abnormal cycle, which needs lab evaluation to determine if underlying etiology  The following portions of the patient's history were reviewed and updated as appropriate: allergies, current medications, past medical history, past social history, past surgical history and problem list     Review of Systems  Review of Systems   Constitutional: Negative for chills, fatigue and fever  Respiratory: Negative for cough and shortness of breath  Cardiovascular: Negative for palpitations  Gastrointestinal: Negative for abdominal distention and abdominal pain  Genitourinary: Positive for menstrual problem and pelvic pain  Negative for vaginal bleeding, vaginal discharge and vaginal pain  Objective  /84   Ht 5' 6" (1 676 m)   Wt 66 7 kg (147 lb)   LMP 06/19/2022 (Exact Date)   BMI 23 73 kg/m²      Physical Exam:  Physical Exam  Constitutional:       General: She is not in acute distress  Appearance: Normal appearance  She is not ill-appearing, toxic-appearing or diaphoretic  Eyes:      General: No scleral icterus  Right eye: No discharge  Left eye: No discharge  Conjunctiva/sclera: Conjunctivae normal    Cardiovascular:      Rate and Rhythm: Normal rate     Pulmonary: Effort: Pulmonary effort is normal  No respiratory distress  Musculoskeletal:         General: No swelling  Skin:     General: Skin is warm and dry  Coloration: Skin is not jaundiced or pale  Findings: No bruising or erythema  Neurological:      Mental Status: She is alert  Psychiatric:         Mood and Affect: Mood normal          Behavior: Behavior normal          Thought Content:  Thought content normal          Judgment: Judgment normal            Reviewed:  Pelvic US 6/29/22

## 2022-08-06 LAB
EXTERNAL HIV SCREEN: NORMAL
HBA1C MFR BLD HPLC: 4.6 %

## 2022-08-10 ENCOUNTER — TELEPHONE (OUTPATIENT)
Dept: SURGICAL ONCOLOGY | Facility: CLINIC | Age: 52
End: 2022-08-10

## 2022-08-10 NOTE — TELEPHONE ENCOUNTER
Called patient and rescheduled her visit with Dr [facility] from 9/19/22 to same day with Fernando Gilliland at 3 pm due to  [facility] being out of office

## 2022-09-12 ENCOUNTER — HOSPITAL ENCOUNTER (OUTPATIENT)
Dept: ULTRASOUND IMAGING | Facility: CLINIC | Age: 52
Discharge: HOME | End: 2022-09-12
Payer: COMMERCIAL

## 2022-09-12 VITALS — HEIGHT: 66 IN | WEIGHT: 140.21 LBS | BODY MASS INDEX: 22.53 KG/M2

## 2022-09-12 DIAGNOSIS — Z12.39 BREAST CANCER SCREENING OTHER THAN MAMMOGRAM: ICD-10-CM

## 2022-09-12 DIAGNOSIS — R92.2 DENSE BREAST TISSUE: ICD-10-CM

## 2022-09-12 PROCEDURE — 76641 ULTRASOUND BREAST COMPLETE: CPT

## 2022-09-13 ENCOUNTER — TELEPHONE (OUTPATIENT)
Dept: HEMATOLOGY ONCOLOGY | Facility: CLINIC | Age: 52
End: 2022-09-13

## 2022-09-13 NOTE — TELEPHONE ENCOUNTER
Appointment Cancellation Or Reschedule     Person calling in Patient    Provider Alon Me   Office Visit Date and Time 9/19/22 @ 3pm   Office Visit Location [place]   Did patient want to reschedule their office appointment? If so, when was it scheduled to? Yes 10/3/22 @ 3:30pm   Did you have STAR scheduled for this appointment? No   Do you need STAR set up for your new appointment? If yes, please send to "PATIENT RIDESHARE" pool for STAR rescheduling No   If you are cancelling appointment, can we notify STAR to cancel ride? If yes, please send to "PATIENT RIDESHARE" pool for STAR to cancel service NA   Is this patient calling to reschedule an infusion appointment? No   When is their next infusion appointment? Na   Is this patient a Chemo patient? No   Reason for Cancellation or Reschedule Patient is unable to keep appt due to work schedule      If the patient is a treatment patient, please route this to the office nurse  If the patient is not on treatment, please route to the office MA  If the patient is a surgical oncology patient, please route to surg/onc clinical pool  supervision

## 2022-10-03 ENCOUNTER — OFFICE VISIT (OUTPATIENT)
Dept: SURGICAL ONCOLOGY | Facility: CLINIC | Age: 52
End: 2022-10-03
Payer: COMMERCIAL

## 2022-10-03 VITALS
DIASTOLIC BLOOD PRESSURE: 78 MMHG | WEIGHT: 145 LBS | TEMPERATURE: 97.9 F | BODY MASS INDEX: 23.3 KG/M2 | OXYGEN SATURATION: 99 % | SYSTOLIC BLOOD PRESSURE: 120 MMHG | HEART RATE: 65 BPM | HEIGHT: 66 IN | RESPIRATION RATE: 16 BRPM

## 2022-10-03 DIAGNOSIS — Z91.89 INCREASED RISK OF BREAST CANCER: ICD-10-CM

## 2022-10-03 DIAGNOSIS — Z80.3 FAMILY HISTORY OF BREAST CANCER: Primary | ICD-10-CM

## 2022-10-03 DIAGNOSIS — Z12.31 VISIT FOR SCREENING MAMMOGRAM: ICD-10-CM

## 2022-10-03 PROCEDURE — 99213 OFFICE O/P EST LOW 20 MIN: CPT | Performed by: NURSE PRACTITIONER

## 2022-10-03 NOTE — PROGRESS NOTES
Surgical Oncology Follow Up       [address] [facility] SURGICAL ONCOLOGY [facility]  [facility] [address]    Jeannette Weeks  1970  [id number]      Chief Complaint   Patient presents with    office visit       Assessment/Plan:  1  Family history of breast cancer  - 1 year f/u visit with Dr Sundar De La Torre    2  Increased risk of breast cancer      3  Visit for screening mammogram  - Mammo screening bilateral w 3d & cad; Future      Discussion/Summary:  Patient is a 44-year-old female that presents today for a follow-up visit for a family history of breast cancer  She reports 2 maternal aunts with breast cancer diagnoses  She has been screened with alternating mammogram and ABUS  Her most recent imaging was benign  I reviewed with her that she no longer has dense breast tissue based on her most recent mammogram   Her lifetime TC risk is about 13%  There are no concerns on her exam today  I would therefore recommend discontinuing ABUS screening and continuing with annual 3D mammography and clinical breast exams every 6 months  Script given for mammogram  She will return to the office in 1 year and will have a clinical exam with her GYN in the interim  All of her questions were answered today  History of Present Illness:     -Interval History:  Patient presents today for a follow-up visit for a family history of breast cancer  She notices no changes on her self-breast exam   She had a recent automated breast ultrasound which was BI-RADS 1  She would a bilateral 3D screening mammogram in March of 2022 which was BI-RADS 1, category 2 density  Her most recent lifetime TC risk was estimated to be 12 75%  She reports no changes in her family history  Review of Systems:  Review of Systems   Constitutional: Negative for chills, fatigue and fever  Respiratory: Negative for cough and shortness of breath  Cardiovascular: Negative for chest pain     Hematological: Negative for adenopathy  Psychiatric/Behavioral: Negative for confusion  Patient Active Problem List   Diagnosis    Encounter for routine gynecological examination with Papanicolaou smear of cervix    Dense breast tissue    Family history of breast cancer    Increased risk of breast cancer    Screening mammogram for high-risk patient    Breast cancer screening other than mammogram    Acute non-recurrent frontal sinusitis    Vaginal candidiasis    Asthma    Environmental allergies    Acute bronchitis    Cystitis    Non-recurrent acute serous otitis media of right ear     Past Medical History:   Diagnosis Date    Asthma     Constipation     Female pelvic pain     Headache     Hemorrhoid     Wears glasses      Past Surgical History:   Procedure Laterality Date    BUNIONECTOMY Right 1999    CARPAL TUNNEL RELEASE Bilateral     performed a year apart more than 6 years ago    COLONOSCOPY  05/11/2018    1 polyp that was tubular adenoma    CYSTECTOMY      UPPER GASTROINTESTINAL ENDOSCOPY  11/28/2017    Mild gastritis-biopsy negative for H  pylori    Biopsy of duodenum and's normal, esophageal candidiasis positive biopsy by Dr Pamela Sadler     Family History   Problem Relation Age of Onset    Breast cancer Maternal Aunt 54    Breast cancer Maternal Aunt 61    Lung cancer Maternal Grandmother 67    Stomach cancer Maternal Aunt 67    Colon polyps Mother     Colon polyps Father     No Known Problems Daughter     No Known Problems Maternal Grandfather     No Known Problems Paternal Grandmother     No Known Problems Paternal Grandfather     No Known Problems Daughter     No Known Problems Paternal Aunt      Social History     Socioeconomic History    Marital status: Legally      Spouse name: Not on file    Number of children: Not on file    Years of education: Not on file    Highest education level: Not on file   Occupational History    Occupation: juvenile probation aid   Tobacco Use    Smoking status: Current Every Day Smoker     Packs/day: 0 25     Types: Cigarettes    Smokeless tobacco: Never Used    Tobacco comment: 1/2 pack daily   Vaping Use    Vaping Use: Never used   Substance and Sexual Activity    Alcohol use: Yes     Comment: 1-2 drinks per week    Drug use: No    Sexual activity: Yes     Partners: Male     Birth control/protection: Male Sterilization   Other Topics Concern    Not on file   Social History Narrative    Not on file     Social Determinants of Health     Financial Resource Strain: Not on file   Food Insecurity: Not on file   Transportation Needs: Not on file   Physical Activity: Not on file   Stress: Not on file   Social Connections: Not on file   Intimate Partner Violence: Not on file   Housing Stability: Not on file       Current Outpatient Medications:     albuterol (PROVENTIL HFA,VENTOLIN HFA) 90 mcg/act inhaler, Inhale 2 puffs every 6 (six) hours as needed for wheezing, Disp: , Rfl:     ALPRAZolam (XANAX) 0 25 mg tablet, Take 1 tab by mouth daily as needed for anxiety, Disp: , Rfl:     azelastine (ASTELIN) 0 1 % nasal spray, 1 spray into each nostril daily, Disp: 30 mL, Rfl: 0    budesonide-formoterol (SYMBICORT) 160-4 5 mcg/act inhaler, Symbicort 160 mcg-4 5 mcg/actuation HFA aerosol inhaler  INHALE 2 PUFFS TWO TIMES A DAY, Disp: , Rfl:     buPROPion (WELLBUTRIN SR) 150 mg 12 hr tablet, Take 150 mg by mouth 2 (two) times a day, Disp: , Rfl:     cetirizine (ZyrTEC) 10 mg tablet, Take 10 mg by mouth daily at bedtime prn , Disp: , Rfl:     Loratadine 10 MG CAPS, Take by mouth as needed, Disp: , Rfl:     olopatadine HCl (PATADAY) 0 2 % opth drops, Administer 1 drop to both eyes daily, Disp: 2 5 mL, Rfl: 0  Allergies   Allergen Reactions    Methylprednisolone     Penicillins Hives     Last reaction 30 years ago    Prednisone      Other reaction(s): heart racing, jittery    Sulfa Antibiotics      Vitals:    10/03/22 1530   BP: 120/78   Pulse: 65   Resp: 16   Temp: 97 9 °F (36 6 °C)   SpO2: 99%       Physical Exam  Vitals reviewed  Constitutional:       Appearance: Normal appearance  HENT:      Head: Normocephalic and atraumatic  Pulmonary:      Effort: Pulmonary effort is normal    Chest:   Breasts:      Right: No swelling, bleeding, inverted nipple, mass, nipple discharge, skin change, tenderness, axillary adenopathy or supraclavicular adenopathy  Left: No swelling, bleeding, inverted nipple, mass, nipple discharge, skin change, tenderness, axillary adenopathy or supraclavicular adenopathy  Lymphadenopathy:      Upper Body:      Right upper body: No supraclavicular or axillary adenopathy  Left upper body: No supraclavicular or axillary adenopathy  Neurological:      General: No focal deficit present  Mental Status: She is alert and oriented to person, place, and time  Psychiatric:         Mood and Affect: Mood normal            Results:    Imaging  US breast screening bilateral complete (ABUS)    Result Date: 9/13/2022  Narrative: DIAGNOSIS: Dense breast tissue; Breast cancer screening other than mammogram TECHNIQUE: Automated breast ultrasound images were obtained on the [employer] system  Imaging was obtained in standard projections including AP, lateral and medial for both breasts, inclusive of all four quadrants  COMPARISONS: Prior breast imaging dated: 03/15/2022, 09/01/2021, 03/05/2021, 07/28/2020, 01/20/2020, 07/19/2019, 01/18/2019, 01/15/2018, 01/14/2017, 01/05/2016, and 12/31/2014 RELEVANT HISTORY: Family Breast Cancer History: History of breast cancer in Maternal Aunt, Maternal Aunt  Family Medical History: Family medical history includes breast cancer in 2 relatives (maternal aunt, maternal aunt)  Personal History: Hormone history includes birth control and hormone replacement therapy  No known relevant surgical history  No known relevant medical history   RISK ASSESSMENT: 5 Year Tyrer-Cuzick: 1 55 % 10 Year Tyrer-Cuzick: 3 33 % Lifetime Tyrer-Cuzick: 12 75 % TISSUE COMPOSITION: Homogeneous background echotexture - fibroglandular INDICATION: Debbi Rodriguez is a 45 y o  female with dense breasts presenting for automated breast ultrasound screening  FINDINGS: Bilateral There are no suspicious masses or areas of architectural distortion  Impression:  No evidence of malignancy  Automated breast ultrasound is an adjunct, not a replacement, for routine yearly screening mammography  ASSESSMENT/BI-RADS CATEGORY: Left: 1 - Negative Right: 1 - Negative Overall: 1 - Negative RECOMMENDATION:      - Routine screening for both breasts  Workstation ID: [id number]      I reviewed the above imaging data  Advance Care Planning/Advance Directives:  Discussed disease status and treatment goals with the patient

## 2022-10-11 PROBLEM — H65.01 NON-RECURRENT ACUTE SEROUS OTITIS MEDIA OF RIGHT EAR: Status: RESOLVED | Noted: 2022-06-02 | Resolved: 2022-10-11

## 2022-10-19 DIAGNOSIS — B37.9 ANTIBIOTIC-INDUCED YEAST INFECTION: ICD-10-CM

## 2022-10-19 DIAGNOSIS — T36.95XA ANTIBIOTIC-INDUCED YEAST INFECTION: ICD-10-CM

## 2022-10-19 RX ORDER — FLUCONAZOLE 150 MG/1
TABLET ORAL
Qty: 1 TABLET | Refills: 0 | Status: SHIPPED | OUTPATIENT
Start: 2022-10-19 | End: 2022-10-20

## 2022-11-15 ENCOUNTER — TELEPHONE (OUTPATIENT)
Dept: OBGYN CLINIC | Facility: MEDICAL CENTER | Age: 52
End: 2022-11-15

## 2022-11-15 NOTE — TELEPHONE ENCOUNTER
Patient called to schedule an appt with Dr Liz Lazo in [facility] due to a possible yeast infection  Explained to patient that her next available was on 12/20/2022  Offered for her to see another provider but declined and hung up

## 2022-12-13 ENCOUNTER — OFFICE VISIT (OUTPATIENT)
Dept: URGENT CARE | Facility: CLINIC | Age: 52
End: 2022-12-13

## 2022-12-13 VITALS
TEMPERATURE: 97.7 F | SYSTOLIC BLOOD PRESSURE: 120 MMHG | HEART RATE: 74 BPM | OXYGEN SATURATION: 98 % | DIASTOLIC BLOOD PRESSURE: 80 MMHG | RESPIRATION RATE: 16 BRPM

## 2022-12-13 DIAGNOSIS — Z23 NEEDS FLU SHOT: ICD-10-CM

## 2022-12-13 DIAGNOSIS — J01.10 ACUTE NON-RECURRENT FRONTAL SINUSITIS: Primary | ICD-10-CM

## 2022-12-13 DIAGNOSIS — B37.31 VAGINAL CANDIDIASIS: ICD-10-CM

## 2022-12-13 RX ORDER — DOXYCYCLINE 100 MG/1
100 TABLET ORAL 2 TIMES DAILY
Qty: 14 TABLET | Refills: 0 | Status: SHIPPED | OUTPATIENT
Start: 2022-12-13 | End: 2022-12-20

## 2022-12-13 RX ORDER — FLUCONAZOLE 150 MG/1
TABLET ORAL
Qty: 2 TABLET | Refills: 0 | Status: SHIPPED | OUTPATIENT
Start: 2022-12-13 | End: 2022-12-20

## 2022-12-13 NOTE — PROGRESS NOTES
[facility]        NAME: Sherie Barnes is a 47 y o  female  : 1970    MRN: [id number]  DATE: 2022  TIME: 2:23 PM    Assessment and Plan   Acute non-recurrent frontal sinusitis [J01 10]  1  Acute non-recurrent frontal sinusitis  doxycycline (ADOXA) 100 MG tablet      2  Vaginal candidiasis  fluconazole (DIFLUCAN) 150 mg tablet      3  Needs flu shot  FLUARIX: influenza vaccine, quadrivalent, 0 5 mL            Patient Instructions       Take Doxycycline as prescribed, take with food  Wear sunscreen when outside  Take Diflucan if symptoms of a yeast infection develop  Continue Astelin nasal spray daily  Increase your water intake and use nasal saline rinses  Recommend Mucinex as needed  Follow up with your PCP or return to the clinic if symptoms worsen or persist more than 3-5 days  Proceed to  ER if symptoms worsen  Chief Complaint     Chief Complaint   Patient presents with   • Sinusitis         History of Present Illness       Cough, sinus congestion, green purulent nasal discharge, post-nasal drip x 2 months  No fever or chills  No chest congestion  Symptoms are ongoing, not worse or better  She feels cough is due to post-nasal drip  She has tried saline nasal spray, astelin nasal spray- did not help  She has not been sleeping well due to working as a  in the evenings  She is a smoker  She plans to quit smoking next month  Pt also requests a flu vaccine  Pt advised she can get the vaccine with a mild/moderate illness but recommend waiting if illness is severe or fevers are present  Pt verbalized understanding and would like the flu shot today  Review of Systems   Review of Systems   Constitutional: Negative  Negative for chills, fatigue and fever  HENT: Positive for congestion, postnasal drip, rhinorrhea, sinus pressure, sinus pain and sore throat  Negative for ear discharge, ear pain, hearing loss and trouble swallowing  Eyes: Negative  Negative for photophobia  Respiratory: Positive for cough  Negative for chest tightness, shortness of breath and wheezing  Cardiovascular: Negative  Negative for chest pain and palpitations  Skin: Negative  Allergic/Immunologic: Positive for environmental allergies  Neurological: Positive for headaches  Negative for dizziness and syncope  All other systems reviewed and are negative  Current Medications       Current Outpatient Medications:   •  albuterol (PROVENTIL HFA,VENTOLIN HFA) 90 mcg/act inhaler, Inhale 2 puffs every 6 (six) hours as needed for wheezing, Disp: , Rfl:   •  ALPRAZolam (XANAX) 0 25 mg tablet, Take 1 tab by mouth daily as needed for anxiety, Disp: , Rfl:   •  azelastine (ASTELIN) 0 1 % nasal spray, 1 spray into each nostril daily, Disp: 30 mL, Rfl: 0  •  budesonide-formoterol (SYMBICORT) 160-4 5 mcg/act inhaler, Symbicort 160 mcg-4 5 mcg/actuation HFA aerosol inhaler  INHALE 2 PUFFS TWO TIMES A DAY, Disp: , Rfl:   •  buPROPion (WELLBUTRIN SR) 150 mg 12 hr tablet, Take 150 mg by mouth 2 (two) times a day, Disp: , Rfl:   •  cetirizine (ZyrTEC) 10 mg tablet, Take 10 mg by mouth daily at bedtime prn , Disp: , Rfl:   •  doxycycline (ADOXA) 100 MG tablet, Take 1 tablet (100 mg total) by mouth 2 (two) times a day for 7 days, Disp: 14 tablet, Rfl: 0  •  fluconazole (DIFLUCAN) 150 mg tablet, Take 1 tablet if symptoms of a yeast infection develop    May repeat 1 time in 3 days if symptoms persist , Disp: 2 tablet, Rfl: 0  •  Loratadine 10 MG CAPS, Take by mouth as needed, Disp: , Rfl:   •  olopatadine HCl (PATADAY) 0 2 % opth drops, Administer 1 drop to both eyes daily, Disp: 2 5 mL, Rfl: 0    Current Allergies     Allergies as of 12/13/2022 - Reviewed 12/13/2022   Allergen Reaction Noted   • Methylprednisolone     • Penicillins Hives 09/10/2012   • Prednisone  04/13/2015   • Sulfa antibiotics  04/13/2015            The following portions of the patient's history were reviewed and updated as appropriate: allergies, current medications, past family history, past medical history, past social history, past surgical history and problem list      Past Medical History:   Diagnosis Date   • Asthma    • Constipation    • Female pelvic pain    • Headache    • Hemorrhoid    • Wears glasses        Past Surgical History:   Procedure Laterality Date   • BUNIONECTOMY Right 1999   • CARPAL TUNNEL RELEASE Bilateral     performed a year apart more than 6 years ago   • COLONOSCOPY  05/11/2018    1 polyp that was tubular adenoma   • CYSTECTOMY     • UPPER GASTROINTESTINAL ENDOSCOPY  11/28/2017    Mild gastritis-biopsy negative for H  pylori  Biopsy of duodenum and's normal, esophageal candidiasis positive biopsy by Dr Gayle Aldridge       Family History   Problem Relation Age of Onset   • Breast cancer Maternal Aunt 55   • Breast cancer Maternal Aunt 60   • Lung cancer Maternal Grandmother 72   • Stomach cancer Maternal Aunt 72   • Colon polyps Mother    • Colon polyps Father    • No Known Problems Daughter    • No Known Problems Maternal Grandfather    • No Known Problems Paternal Grandmother    • No Known Problems Paternal Grandfather    • No Known Problems Daughter    • No Known Problems Paternal Aunt          Medications have been verified  Objective   /80 (BP Location: Right arm, Patient Position: Sitting, Cuff Size: Adult)   Pulse 74   Temp 97 7 °F (36 5 °C) (Tympanic)   Resp 16   SpO2 98%   No LMP recorded  Physical Exam     Physical Exam  Vitals and nursing note reviewed  Constitutional:       General: She is not in acute distress  Appearance: Normal appearance  She is well-developed  HENT:      Head: Normocephalic and atraumatic  Jaw: There is normal jaw occlusion  Right Ear: Hearing, ear canal and external ear normal  A middle ear effusion is present  Left Ear: Hearing, ear canal and external ear normal  A middle ear effusion is present        Nose: Mucosal edema, congestion and rhinorrhea present  Rhinorrhea is purulent  Right Turbinates: Swollen  Left Turbinates: Swollen  Right Sinus: Maxillary sinus tenderness and frontal sinus tenderness present  Left Sinus: Maxillary sinus tenderness and frontal sinus tenderness present  Mouth/Throat:      Lips: Pink  Mouth: Mucous membranes are moist       Dentition: Normal dentition  Pharynx: Uvula midline  Posterior oropharyngeal erythema present  No oropharyngeal exudate  Tonsils: No tonsillar exudate  1+ on the right  1+ on the left  Eyes:      Conjunctiva/sclera: Conjunctivae normal       Pupils: Pupils are equal, round, and reactive to light  Cardiovascular:      Rate and Rhythm: Normal rate and regular rhythm  Pulses: Normal pulses  Heart sounds: Normal heart sounds  Pulmonary:      Effort: Pulmonary effort is normal       Breath sounds: Normal breath sounds  Musculoskeletal:         General: Normal range of motion  Cervical back: Normal range of motion and neck supple  Skin:     General: Skin is warm and dry  Capillary Refill: Capillary refill takes less than 2 seconds  Neurological:      General: No focal deficit present  Mental Status: She is alert and oriented to person, place, and time     Psychiatric:         Mood and Affect: Mood normal          Behavior: Behavior normal

## 2022-12-13 NOTE — ASSESSMENT & PLAN NOTE
History and exam findings consistent with sinusitis  Rx written for doxycycline  Symptomatic care also recommended  Reasons to follow up reviewed with pt  All questions answered

## 2022-12-13 NOTE — PATIENT INSTRUCTIONS
Take Doxycycline as prescribed, take with food  Wear sunscreen when outside  Take Diflucan if symptoms of a yeast infection develop  Continue Astelin nasal spray daily  Increase your water intake and use nasal saline rinses  Recommend Mucinex as needed  Follow up with your PCP or return to the clinic if symptoms worsen or persist more than 3-5 days

## 2022-12-13 NOTE — ASSESSMENT & PLAN NOTE
History of vaginal yeast infections when taking antibiotics  Rx written for Diflucan to take if symptoms occur

## 2023-01-23 DIAGNOSIS — Z91.09 ENVIRONMENTAL ALLERGIES: Primary | ICD-10-CM

## 2023-01-23 DIAGNOSIS — J01.10 ACUTE NON-RECURRENT FRONTAL SINUSITIS: ICD-10-CM

## 2023-01-23 RX ORDER — AZELASTINE 1 MG/ML
SPRAY, METERED NASAL
Qty: 30 ML | Refills: 0 | Status: SHIPPED | OUTPATIENT
Start: 2023-01-23

## 2023-03-30 ENCOUNTER — OFFICE VISIT (OUTPATIENT)
Dept: URGENT CARE | Facility: CLINIC | Age: 53
End: 2023-03-30

## 2023-03-30 VITALS
SYSTOLIC BLOOD PRESSURE: 110 MMHG | HEART RATE: 84 BPM | DIASTOLIC BLOOD PRESSURE: 70 MMHG | RESPIRATION RATE: 16 BRPM | TEMPERATURE: 98.4 F | OXYGEN SATURATION: 98 %

## 2023-03-30 DIAGNOSIS — J01.10 ACUTE NON-RECURRENT FRONTAL SINUSITIS: Primary | ICD-10-CM

## 2023-03-30 DIAGNOSIS — B37.31 VAGINAL CANDIDIASIS: ICD-10-CM

## 2023-03-30 RX ORDER — DOXYCYCLINE 100 MG/1
100 TABLET ORAL 2 TIMES DAILY
Qty: 14 TABLET | Refills: 0 | Status: SHIPPED | OUTPATIENT
Start: 2023-03-30 | End: 2023-04-06

## 2023-03-30 RX ORDER — FLUCONAZOLE 150 MG/1
150 TABLET ORAL ONCE
Qty: 1 TABLET | Refills: 0 | Status: SHIPPED | OUTPATIENT
Start: 2023-03-30 | End: 2023-03-30

## 2023-03-30 NOTE — PROGRESS NOTES
[email or web address]        NAME: Adrianne Maya is a 47 y o  female  : 1970    MRN: [id number]  DATE: 2023  TIME: 11:43 AM    Assessment and Plan   Acute non-recurrent frontal sinusitis [J01 10]  1  Acute non-recurrent frontal sinusitis  doxycycline (ADOXA) 100 MG tablet      2  Vaginal candidiasis  fluconazole (DIFLUCAN) 150 mg tablet            Patient Instructions       Take Doxycycline as prescribed, take with food  Take diflucan if needed for yeast infection symptoms  Continue Astelin nasal spray daily  Increase your water intake and use nasal saline rinses  Recommend Mucinex as needed  Follow up with your PCP or return to the clinic if symptoms worsen or persist more than 3-5 days  Proceed to  ER if symptoms worsen  Chief Complaint     Chief Complaint   Patient presents with   • Sinusitis         History of Present Illness       Sinus pressure/ congestion, purulent nasal discharge, sore throat, fatigue x 2-3 days  Subjective fever  She has had sinus pressure for several months now but symptoms worsened this week  She flew home from [facility] prior to onset  She took an at home COVID test- was negative  She tried mucinex and motrin- mild improvement  She also reports she gets a vaginal yeast infection with antibiotic use  Review of Systems   Review of Systems   Constitutional: Positive for fever (subjective)  Negative for chills and fatigue  HENT: Positive for congestion, ear pain (pressure), postnasal drip, rhinorrhea, sinus pressure (frontal), sinus pain and sore throat  Negative for hearing loss and trouble swallowing  Eyes: Negative  Negative for redness  Respiratory: Positive for cough  Negative for shortness of breath and wheezing  Cardiovascular: Negative  Negative for chest pain and palpitations  Musculoskeletal: Negative for myalgias  Skin: Negative  Negative for rash  Allergic/Immunologic: Positive for environmental allergies     All other systems reviewed and are negative          Current Medications       Current Outpatient Medications:   •  albuterol (PROVENTIL HFA,VENTOLIN HFA) 90 mcg/act inhaler, Inhale 2 puffs every 6 (six) hours as needed for wheezing, Disp: , Rfl:   •  ALPRAZolam (XANAX) 0 25 mg tablet, Take 1 tab by mouth daily as needed for anxiety, Disp: , Rfl:   •  azelastine (ASTELIN) 0 1 % nasal spray, USE ONE SPRAY INTO EACH NOSTRIL ONCE DAILY, Disp: 30 mL, Rfl: 0  •  budesonide-formoterol (SYMBICORT) 160-4 5 mcg/act inhaler, Symbicort 160 mcg-4 5 mcg/actuation HFA aerosol inhaler  INHALE 2 PUFFS TWO TIMES A DAY, Disp: , Rfl:   •  buPROPion (WELLBUTRIN SR) 150 mg 12 hr tablet, Take 150 mg by mouth 2 (two) times a day, Disp: , Rfl:   •  cetirizine (ZyrTEC) 10 mg tablet, Take 10 mg by mouth daily at bedtime prn , Disp: , Rfl:   •  doxycycline (ADOXA) 100 MG tablet, Take 1 tablet (100 mg total) by mouth 2 (two) times a day for 7 days, Disp: 14 tablet, Rfl: 0  •  fluconazole (DIFLUCAN) 150 mg tablet, Take 1 tablet (150 mg total) by mouth once for 1 dose, Disp: 1 tablet, Rfl: 0  •  olopatadine HCl (PATADAY) 0 2 % opth drops, Administer 1 drop to both eyes daily, Disp: 2 5 mL, Rfl: 0  •  Loratadine 10 MG CAPS, Take by mouth as needed, Disp: , Rfl:     Current Allergies     Allergies as of 03/30/2023 - Reviewed 03/30/2023   Allergen Reaction Noted   • Methylprednisolone     • Penicillins Hives 09/10/2012   • Prednisone  04/13/2015   • Sulfa antibiotics  04/13/2015            The following portions of the patient's history were reviewed and updated as appropriate: allergies, current medications, past family history, past medical history, past social history, past surgical history and problem list      Past Medical History:   Diagnosis Date   • Asthma    • Constipation    • Female pelvic pain    • Headache    • Hemorrhoid    • Wears glasses        Past Surgical History:   Procedure Laterality Date   • BUNIONECTOMY Right 1999   • CARPAL TUNNEL RELEASE Bilateral     performed a year apart more than 6 years ago   • COLONOSCOPY  05/11/2018    1 polyp that was tubular adenoma   • CYSTECTOMY     • UPPER GASTROINTESTINAL ENDOSCOPY  11/28/2017    Mild gastritis-biopsy negative for H  pylori  Biopsy of duodenum and's normal, esophageal candidiasis positive biopsy by Dr Padmini Gilliland       Family History   Problem Relation Age of Onset   • Breast cancer Maternal Aunt 55   • Breast cancer Maternal Aunt 60   • Lung cancer Maternal Grandmother 72   • Stomach cancer Maternal Aunt 72   • Colon polyps Mother    • Colon polyps Father    • No Known Problems Daughter    • No Known Problems Maternal Grandfather    • No Known Problems Paternal Grandmother    • No Known Problems Paternal Grandfather    • No Known Problems Daughter    • No Known Problems Paternal Aunt          Medications have been verified  Objective   /70 (BP Location: Right arm, Patient Position: Sitting, Cuff Size: Adult)   Pulse 84   Temp 98 4 °F (36 9 °C) (Tympanic)   Resp 16   SpO2 98%   No LMP recorded  Physical Exam     Physical Exam  Vitals and nursing note reviewed  Constitutional:       General: She is not in acute distress  Appearance: Normal appearance  She is well-developed  HENT:      Head: Normocephalic and atraumatic  Jaw: There is normal jaw occlusion  Right Ear: Hearing, ear canal and external ear normal  A middle ear effusion is present  Left Ear: Hearing, ear canal and external ear normal  A middle ear effusion is present  Nose: Mucosal edema, congestion and rhinorrhea present  Rhinorrhea is purulent  Right Turbinates: Swollen  Left Turbinates: Swollen  Right Sinus: Frontal sinus tenderness present  Left Sinus: Frontal sinus tenderness present  Mouth/Throat:      Lips: Pink  Mouth: Mucous membranes are moist       Dentition: Normal dentition  Pharynx: Uvula midline   Posterior oropharyngeal erythema (cobblestoning appearance to throat) present  No oropharyngeal exudate  Tonsils: No tonsillar exudate  1+ on the right  1+ on the left  Eyes:      Conjunctiva/sclera: Conjunctivae normal       Pupils: Pupils are equal, round, and reactive to light  Cardiovascular:      Rate and Rhythm: Normal rate and regular rhythm  Pulses: Normal pulses  Heart sounds: Normal heart sounds  Pulmonary:      Effort: Pulmonary effort is normal       Breath sounds: Normal breath sounds  Musculoskeletal:         General: Normal range of motion  Cervical back: Normal range of motion and neck supple  Skin:     General: Skin is warm and dry  Capillary Refill: Capillary refill takes less than 2 seconds  Neurological:      General: No focal deficit present  Mental Status: She is alert and oriented to person, place, and time     Psychiatric:         Mood and Affect: Mood normal          Behavior: Behavior normal

## 2023-03-30 NOTE — PATIENT INSTRUCTIONS
Take Doxycycline as prescribed, take with food  Take diflucan if needed for yeast infection symptoms  Continue Astelin nasal spray daily  Increase your water intake and use nasal saline rinses  Recommend Mucinex as needed  Follow up with your PCP or return to the clinic if symptoms worsen or persist more than 3-5 days

## 2023-04-04 ENCOUNTER — HOSPITAL ENCOUNTER (OUTPATIENT)
Dept: MAMMOGRAPHY | Facility: MEDICAL CENTER | Age: 53
Discharge: HOME | End: 2023-04-04

## 2023-04-04 VITALS — HEIGHT: 66 IN | BODY MASS INDEX: 23.31 KG/M2 | WEIGHT: 145.06 LBS

## 2023-04-04 DIAGNOSIS — Z12.31 VISIT FOR SCREENING MAMMOGRAM: ICD-10-CM

## 2023-06-01 ENCOUNTER — PATIENT MESSAGE (OUTPATIENT)
Dept: OBGYN CLINIC | Facility: MEDICAL CENTER | Age: 53
End: 2023-06-01

## 2023-06-01 DIAGNOSIS — B37.9 CANDIDIASIS: Primary | ICD-10-CM

## 2023-06-01 DIAGNOSIS — B37.9 CANDIDIASIS: ICD-10-CM

## 2023-06-01 RX ORDER — FLUCONAZOLE 150 MG/1
150 TABLET ORAL ONCE
Qty: 1 TABLET | Refills: 0 | Status: SHIPPED | OUTPATIENT
Start: 2023-06-01 | End: 2023-06-01 | Stop reason: SDUPTHER

## 2023-06-01 RX ORDER — FLUCONAZOLE 150 MG/1
150 TABLET ORAL ONCE
Qty: 1 TABLET | Refills: 0 | Status: SHIPPED | OUTPATIENT
Start: 2023-06-01 | End: 2023-06-01

## 2023-06-01 NOTE — TELEPHONE ENCOUNTER
Patient states medication was sent to the wrong pharmacy  She no longer uses [facility]  Preferred pharmacy [facility] on [address] updated and on file  Thanks!

## 2023-06-06 ENCOUNTER — OFFICE VISIT (OUTPATIENT)
Dept: URGENT CARE | Facility: CLINIC | Age: 53
End: 2023-06-06

## 2023-06-06 VITALS
HEART RATE: 76 BPM | DIASTOLIC BLOOD PRESSURE: 80 MMHG | OXYGEN SATURATION: 97 % | SYSTOLIC BLOOD PRESSURE: 122 MMHG | TEMPERATURE: 98 F | RESPIRATION RATE: 16 BRPM

## 2023-06-06 DIAGNOSIS — N30.90 CYSTITIS: Primary | ICD-10-CM

## 2023-06-06 DIAGNOSIS — B37.31 VAGINAL CANDIDIASIS: ICD-10-CM

## 2023-06-06 LAB
SL AMB  POCT GLUCOSE, UA: NEGATIVE
SL AMB LEUKOCYTE ESTERASE,UA: ABNORMAL
SL AMB POCT BILIRUBIN,UA: NEGATIVE
SL AMB POCT BLOOD,UA: NEGATIVE
SL AMB POCT CLARITY,UA: CLEAR
SL AMB POCT COLOR,UA: YELLOW
SL AMB POCT KETONES,UA: NEGATIVE
SL AMB POCT NITRITE,UA: NEGATIVE
SL AMB POCT PH,UA: 7
SL AMB POCT SPECIFIC GRAVITY,UA: 1.01
SL AMB POCT URINE PROTEIN: ABNORMAL
SL AMB POCT UROBILINOGEN: NEGATIVE

## 2023-06-06 PROCEDURE — 87086 URINE CULTURE/COLONY COUNT: CPT | Performed by: NURSE PRACTITIONER

## 2023-06-06 RX ORDER — NITROFURANTOIN 25; 75 MG/1; MG/1
100 CAPSULE ORAL 2 TIMES DAILY
Qty: 14 CAPSULE | Refills: 0 | Status: SHIPPED | OUTPATIENT
Start: 2023-06-06 | End: 2023-06-13

## 2023-06-06 RX ORDER — FLUCONAZOLE 150 MG/1
150 TABLET ORAL ONCE
Qty: 1 TABLET | Refills: 0 | Status: SHIPPED | OUTPATIENT
Start: 2023-06-06 | End: 2023-06-06

## 2023-06-06 RX ORDER — GLYCERIN/POLYCARBOPHL/CARBOMER
GEL WITH PREFILLED APPLICATOR (GRAM) VAGINAL
COMMUNITY

## 2023-06-06 NOTE — PROGRESS NOTES
"  [facility]        NAME: Riky George is a 47 y o  female  : 1970    MRN: [id number]  DATE: 2023  TIME: 12:25 PM    Assessment and Plan   Cystitis [N30 90]  1  Cystitis  nitrofurantoin (MACROBID) 100 mg capsule    Urine culture    POCT urine dip      2  Vaginal candidiasis  fluconazole (DIFLUCAN) 150 mg tablet            Patient Instructions       Increase water intake  Take antibiotics as prescribed  Take with food  Take diflucan if symptoms of a yeast infection develop  Follow up with your PCP or return to the clinic if symptoms worsen or persist more than 2-3 days  Proceed to  ER if symptoms worsen  Chief Complaint     Chief Complaint   Patient presents with   • Possible UTI         History of Present Illness       Possible UTI x 2 days  Suprapubic pressure and urinary frequency present  No vaginal discharge, flank pain, fever or chills  Pt had \"food poisoning\" from undercooked meat and diarrhea prior to onset  She tried taking a pill for a yeast infection- did not help  Review of Systems   Review of Systems   Constitutional: Negative  Negative for chills, fatigue and fever  HENT: Negative  Respiratory: Negative  Negative for cough, shortness of breath and wheezing  Cardiovascular: Negative  Gastrointestinal: Negative  Genitourinary: Positive for frequency and pelvic pain (suprapubic pressure)  Negative for difficulty urinating, dysuria, flank pain, hematuria, urgency, vaginal bleeding, vaginal discharge and vaginal pain  Musculoskeletal: Negative  Skin: Negative  Allergic/Immunologic: Positive for environmental allergies  All other systems reviewed and are negative          Current Medications       Current Outpatient Medications:   •  albuterol (PROVENTIL HFA,VENTOLIN HFA) 90 mcg/act inhaler, Inhale 2 puffs every 6 (six) hours as needed for wheezing, Disp: , Rfl:   •  ALPRAZolam (XANAX) 0 25 mg tablet, Take 1 tab by mouth daily as needed " for anxiety, Disp: , Rfl:   •  azelastine (ASTELIN) 0 1 % nasal spray, 2 sprays into each nostril 2 (two) times a day Use in each nostril as directed, Disp: 30 mL, Rfl: 0  •  budesonide-formoterol (SYMBICORT) 160-4 5 mcg/act inhaler, Symbicort 160 mcg-4 5 mcg/actuation HFA aerosol inhaler  INHALE 2 PUFFS TWO TIMES A DAY, Disp: , Rfl:   •  buPROPion (WELLBUTRIN SR) 150 mg 12 hr tablet, Take 150 mg by mouth 2 (two) times a day, Disp: , Rfl:   •  cetirizine (ZyrTEC) 10 mg tablet, Take 10 mg by mouth daily at bedtime prn , Disp: , Rfl:   •  fluconazole (DIFLUCAN) 150 mg tablet, Take 1 tablet (150 mg total) by mouth once for 1 dose, Disp: 1 tablet, Rfl: 0  •  Loratadine 10 MG CAPS, Take by mouth as needed, Disp: , Rfl:   •  nitrofurantoin (MACROBID) 100 mg capsule, Take 1 capsule (100 mg total) by mouth 2 (two) times a day for 7 days, Disp: 14 capsule, Rfl: 0  •  olopatadine HCl (PATADAY) 0 2 % opth drops, Administer 1 drop to both eyes daily, Disp: 2 5 mL, Rfl: 0  •  Vaginal Lubricant (RepHresh) GEL, Insert into the vagina, Disp: , Rfl:     Current Allergies     Allergies as of 06/06/2023 - Reviewed 06/06/2023   Allergen Reaction Noted   • Methylprednisolone     • Penicillins Hives 09/10/2012   • Prednisone  04/13/2015   • Sulfa antibiotics  04/13/2015            The following portions of the patient's history were reviewed and updated as appropriate: allergies, current medications, past family history, past medical history, past social history, past surgical history and problem list      Past Medical History:   Diagnosis Date   • Asthma    • Constipation    • Female pelvic pain    • Headache    • Hemorrhoid    • Migraine    • Wears glasses        Past Surgical History:   Procedure Laterality Date   • BUNIONECTOMY Right 1999   • CARPAL TUNNEL RELEASE Bilateral     performed a year apart more than 6 years ago   • COLONOSCOPY  05/11/2018    1 polyp that was tubular adenoma   • CYSTECTOMY     • UPPER GASTROINTESTINAL ENDOSCOPY  11/28/2017    Mild gastritis-biopsy negative for H  pylori  Biopsy of duodenum and's normal, esophageal candidiasis positive biopsy by Dr Francisco Peters       Family History   Problem Relation Age of Onset   • Colon polyps Mother    • Colon polyps Father    • Asthma Father    • No Known Problems Daughter    • No Known Problems Daughter    • Lung cancer Maternal Grandmother 67   • Cancer Maternal Grandmother    • No Known Problems Maternal Grandfather    • No Known Problems Paternal Grandmother    • No Known Problems Paternal Grandfather    • Breast cancer Maternal Aunt 54   • Breast cancer Maternal Aunt 60   • Stomach cancer Maternal Aunt 72   • No Known Problems Paternal Aunt          Medications have been verified  Objective   /80 (BP Location: Right arm, Patient Position: Sitting, Cuff Size: Adult)   Pulse 76   Temp 98 °F (36 7 °C) (Tympanic)   Resp 16   SpO2 97%   No LMP recorded  Patient is perimenopausal        Physical Exam     Physical Exam  Vitals reviewed  Constitutional:       Appearance: Normal appearance  She is well-developed  Cardiovascular:      Rate and Rhythm: Normal rate and regular rhythm  Pulmonary:      Effort: Pulmonary effort is normal    Abdominal:      General: Bowel sounds are normal       Palpations: Abdomen is soft  Tenderness: There is abdominal tenderness (pressure) in the suprapubic area  There is no right CVA tenderness, left CVA tenderness, guarding or rebound  Musculoskeletal:         General: Normal range of motion  Skin:     General: Skin is warm and dry  Capillary Refill: Capillary refill takes less than 2 seconds  Neurological:      General: No focal deficit present  Mental Status: She is alert and oriented to person, place, and time     Psychiatric:         Mood and Affect: Mood normal          Behavior: Behavior normal

## 2023-06-06 NOTE — ASSESSMENT & PLAN NOTE
History and exam findings consistent with cystitis  Urine dip revealed leukocytes and trace protein  Urine culture sent to the lab  Rx written for Macrobid  Pt also requested Diflucan to take prn if symptoms of yeast infection develop  Reasons to follow up reviewed with pt  All questions answered

## 2023-06-06 NOTE — PATIENT INSTRUCTIONS
Increase water intake  Take antibiotics as prescribed  Take with food  Take diflucan if symptoms of a yeast infection develop  Follow up with your PCP or return to the clinic if symptoms worsen or persist more than 2-3 days

## 2023-06-08 LAB — BACTERIA UR CULT: ABNORMAL

## 2023-08-16 ENCOUNTER — PATIENT MESSAGE (OUTPATIENT)
Dept: OBGYN CLINIC | Facility: MEDICAL CENTER | Age: 53
End: 2023-08-16

## 2023-08-16 DIAGNOSIS — B37.9 CANDIDIASIS: Primary | ICD-10-CM

## 2023-08-16 RX ORDER — FLUCONAZOLE 150 MG/1
150 TABLET ORAL ONCE
Qty: 1 TABLET | Refills: 0 | Status: SHIPPED | OUTPATIENT
Start: 2023-08-16 | End: 2023-08-16

## 2023-10-03 ENCOUNTER — TELEPHONE (OUTPATIENT)
Dept: SURGICAL ONCOLOGY | Facility: CLINIC | Age: 53
End: 2023-10-03

## 2023-10-03 NOTE — TELEPHONE ENCOUNTER
Attempted calling Lobo Blount to confirm we would keep her appt as scheduled with Dr Bessy Colon tomorrow at 3:45PM. No answer, left her a detailed message.

## 2023-10-04 ENCOUNTER — OFFICE VISIT (OUTPATIENT)
Dept: SURGICAL ONCOLOGY | Facility: CLINIC | Age: 53
End: 2023-10-04
Payer: COMMERCIAL

## 2023-10-04 VITALS
SYSTOLIC BLOOD PRESSURE: 122 MMHG | DIASTOLIC BLOOD PRESSURE: 82 MMHG | WEIGHT: 147 LBS | BODY MASS INDEX: 23.63 KG/M2 | TEMPERATURE: 97.6 F | HEART RATE: 82 BPM | OXYGEN SATURATION: 99 % | HEIGHT: 66 IN

## 2023-10-04 DIAGNOSIS — B37.31 VAGINAL CANDIDIASIS: ICD-10-CM

## 2023-10-04 DIAGNOSIS — N60.12 FIBROCYSTIC BREAST CHANGES, BILATERAL: Primary | ICD-10-CM

## 2023-10-04 DIAGNOSIS — N60.11 FIBROCYSTIC BREAST CHANGES, BILATERAL: Primary | ICD-10-CM

## 2023-10-04 DIAGNOSIS — Z80.3 FAMILY HISTORY OF BREAST CANCER: ICD-10-CM

## 2023-10-04 DIAGNOSIS — Z12.31 SCREENING MAMMOGRAM FOR BREAST CANCER: ICD-10-CM

## 2023-10-04 PROBLEM — Z91.89 INCREASED RISK OF BREAST CANCER: Status: RESOLVED | Noted: 2019-04-02 | Resolved: 2023-10-04

## 2023-10-04 PROBLEM — R92.30 DENSE BREAST TISSUE: Status: RESOLVED | Noted: 2019-04-02 | Resolved: 2023-10-04

## 2023-10-04 PROBLEM — Z12.39 BREAST CANCER SCREENING OTHER THAN MAMMOGRAM: Status: RESOLVED | Noted: 2019-04-02 | Resolved: 2023-10-04

## 2023-10-04 PROCEDURE — 99213 OFFICE O/P EST LOW 20 MIN: CPT | Performed by: SURGERY

## 2023-10-04 RX ORDER — FLUCONAZOLE 150 MG/1
150 TABLET ORAL ONCE
Qty: 1 TABLET | Refills: 0 | Status: SHIPPED | OUTPATIENT
Start: 2023-10-04 | End: 2023-10-04

## 2023-10-04 RX ORDER — FLUCONAZOLE 150 MG/1
TABLET ORAL
COMMUNITY
Start: 2023-09-04

## 2023-10-04 RX ORDER — BUDESONIDE AND FORMOTEROL FUMARATE DIHYDRATE 160; 4.5 UG/1; UG/1
AEROSOL RESPIRATORY (INHALATION)
COMMUNITY

## 2023-10-04 NOTE — PROGRESS NOTES
Surgical Oncology Follow Up       [address] [facility]  [facility] [address]    Lotus Merrill  1970  [id number]  [facility] SURGICAL ONCOLOGY [place] [id number]    Chief Complaint   Patient presents with   • Follow-up       Assessment/Plan   Diagnoses and all orders for this visit:    Fibrocystic breast changes, bilateral    Vaginal candidiasis  -     fluconazole (DIFLUCAN) 150 mg tablet; Take 1 tablet (150 mg total) by mouth once for 1 dose    Family history of breast cancer    Screening mammogram for breast cancer  -     Mammo screening bilateral w 3d & cad; Future    Other orders  -     budesonide-formoterol (Symbicort) 160-4.5 mcg/act inhaler; INHALE 2 PUFFS TWO TIMES A DAY  -     fluconazole (DIFLUCAN) 150 mg tablet; TAKE ONE TABLET BY MOUTH ONCE FOR ONE DOSE        Advance Care Planning/Advance Directives:  Did not discuss  with the patient. Oncology History:    Oncology History    No history exists. History of Present Illness: f/u visit secondary to fc changes and family history; no concerns  -Interval History:benign mammogram    Review of Systems:  Review of Systems   Constitutional: Negative. Negative for appetite change and fever. HENT:        Recent sinus infection with antibiotics, requesting diflucan   Eyes: Negative. Respiratory: Negative for shortness of breath. Cardiovascular: Negative. Gastrointestinal: Negative. Endocrine: Negative. Genitourinary: Negative. Musculoskeletal: Negative. Negative for arthralgias and myalgias. Skin: Negative. Allergic/Immunologic: Negative. Neurological: Negative. Hematological: Negative. Negative for adenopathy. Does not bruise/bleed easily. Psychiatric/Behavioral: Negative.         Patient Active Problem List   Diagnosis   • Encounter for routine gynecological examination with Papanicolaou smear of cervix   • Family history of breast cancer   • Screening mammogram for breast cancer   • Acute non-recurrent frontal sinusitis   • Vaginal candidiasis   • Asthma   • Environmental allergies   • Acute bronchitis   • Cystitis     Past Medical History:   Diagnosis Date   • Asthma    • Constipation    • Female pelvic pain    • Headache    • Hemorrhoid    • Migraine    • Wears glasses      Past Surgical History:   Procedure Laterality Date   • BUNIONECTOMY Right 1999   • CARPAL TUNNEL RELEASE Bilateral     performed a year apart more than 6 years ago   • COLONOSCOPY  05/11/2018    1 polyp that was tubular adenoma   • CYSTECTOMY     • UPPER GASTROINTESTINAL ENDOSCOPY  11/28/2017    Mild gastritis-biopsy negative for H. pylori. Biopsy of duodenum and's normal, esophageal candidiasis positive biopsy by Dr. Mikael So     Family History   Problem Relation Age of Onset   • Colon polyps Mother    • Colon polyps Father    • Asthma Father    • No Known Problems Daughter    • No Known Problems Daughter    • Lung cancer Maternal Grandmother 67   • Cancer Maternal Grandmother    • No Known Problems Maternal Grandfather    • No Known Problems Paternal Grandmother    • No Known Problems Paternal Grandfather    • Breast cancer Maternal Aunt 54   • Breast cancer Maternal Aunt 60   • Stomach cancer Maternal Aunt 72   • No Known Problems Paternal Aunt      Social History     Socioeconomic History   • Marital status: Legally      Spouse name: Not on file   • Number of children: Not on file   • Years of education: Not on file   • Highest education level: Not on file   Occupational History   • Occupation: juvenile probation aid   Tobacco Use   • Smoking status: Every Day     Packs/day: 0.25     Years: 30.00     Total pack years: 7.50     Types: Cigarettes   • Smokeless tobacco: Never   • Tobacco comments:     1/2 pack daily   Vaping Use   • Vaping Use: Never used   Substance and Sexual Activity   • Alcohol use:  Yes Alcohol/week: 6.0 standard drinks of alcohol     Types: 6 Glasses of wine per week     Comment: 1-2 drinks per week   • Drug use: No   • Sexual activity: Yes     Partners: Male     Birth control/protection: None   Other Topics Concern   • Not on file   Social History Narrative   • Not on file     Social Determinants of Health     Financial Resource Strain: Not on file   Food Insecurity: Not on file   Transportation Needs: Not on file   Physical Activity: Not on file   Stress: Not on file   Social Connections: Not on file   Intimate Partner Violence: Not on file   Housing Stability: Not on file       Current Outpatient Medications:   •  albuterol (PROVENTIL HFA,VENTOLIN HFA) 90 mcg/act inhaler, Inhale 2 puffs every 6 (six) hours as needed for wheezing, Disp: , Rfl:   •  ALPRAZolam (XANAX) 0.25 mg tablet, Take 1 tab by mouth daily as needed for anxiety, Disp: , Rfl:   •  azelastine (ASTELIN) 0.1 % nasal spray, 2 sprays into each nostril 2 (two) times a day Use in each nostril as directed, Disp: 30 mL, Rfl: 0  •  budesonide-formoterol (SYMBICORT) 160-4.5 mcg/act inhaler, Symbicort 160 mcg-4.5 mcg/actuation HFA aerosol inhaler  INHALE 2 PUFFS TWO TIMES A DAY, Disp: , Rfl:   •  budesonide-formoterol (Symbicort) 160-4.5 mcg/act inhaler, INHALE 2 PUFFS TWO TIMES A DAY, Disp: , Rfl:   •  buPROPion (WELLBUTRIN SR) 150 mg 12 hr tablet, Take 150 mg by mouth 2 (two) times a day, Disp: , Rfl:   •  cetirizine (ZyrTEC) 10 mg tablet, Take 10 mg by mouth daily at bedtime prn , Disp: , Rfl:   •  fluconazole (DIFLUCAN) 150 mg tablet, TAKE ONE TABLET BY MOUTH ONCE FOR ONE DOSE, Disp: , Rfl:   •  fluconazole (DIFLUCAN) 150 mg tablet, Take 1 tablet (150 mg total) by mouth once for 1 dose, Disp: 1 tablet, Rfl: 0  •  Loratadine 10 MG CAPS, Take by mouth as needed, Disp: , Rfl:   •  olopatadine HCl (PATADAY) 0.2 % opth drops, Administer 1 drop to both eyes daily, Disp: 2.5 mL, Rfl: 0  •  Vaginal Lubricant (RepHresh) GEL, Insert into the vagina, Disp: , Rfl:   Allergies   Allergen Reactions   • Methylprednisolone    • Penicillins Hives     Last reaction 30 years ago   • Prednisone      Other reaction(s): heart racing, jittery   • Sulfa Antibiotics        The following portions of the patient's history were reviewed and updated as appropriate: allergies, current medications, past family history, past medical history, past social history, past surgical history and problem list.        Vitals:    10/04/23 1551   BP: 122/82   Pulse: 82   Temp: 97.6 °F (36.4 °C)   SpO2: 99%       Physical Exam  Constitutional:       General: She is not in acute distress. Appearance: Normal appearance. She is well-developed. HENT:      Head: Normocephalic and atraumatic. Chest:   Breasts:     Right: No inverted nipple, mass, nipple discharge, skin change or tenderness. Left: No inverted nipple, mass, nipple discharge, skin change or tenderness. Lymphadenopathy:      Upper Body:      Right upper body: No supraclavicular, axillary or pectoral adenopathy. Left upper body: No supraclavicular, axillary or pectoral adenopathy. Neurological:      Mental Status: She is alert and oriented to person, place, and time. Psychiatric:         Mood and Affect: Mood normal.           Results:  Labs:      Imaging  4/4/23 traci 3d screen benign, density 2    I reviewed the above imaging data. Discussion/Summary: 50-year-old female who previously had dense breast tissue, fibrocystic changes and a family history of breast cancer. There are no concerns on exam today. Her recent mammogram was benign. Her density has been a 2 for the past couple years. She therefore had a screening mammogram only this year which is appropriate. I will make arrangements for her mammogram for next year. We will see her again in 1 year for clinical exam or sooner should the need arise.

## 2024-02-21 PROBLEM — J01.10 ACUTE NON-RECURRENT FRONTAL SINUSITIS: Status: RESOLVED | Noted: 2020-03-30 | Resolved: 2024-02-21

## 2024-04-03 ENCOUNTER — NURSE TRIAGE (OUTPATIENT)
Age: 54
End: 2024-04-03

## 2024-04-03 NOTE — TELEPHONE ENCOUNTER
"Patient is reporting a pain that is on her left side that she feels is coming from her ovary.  She states that the pain is dull and achy and is constantly there. She denies any irregular bleeding or any other symptoms.  She would like to make an appointment and possibly get an ultrasound to see what is causing the pain.  Patient scheduled for an appointment.  She was advised to take motrin or tylenol for the pain in the interim.  She verbalized understanding.      Reason for Disposition   Pelvic pain is a chronic symptom (recurrent or ongoing and present > 4 weeks)    Answer Assessment - Initial Assessment Questions  1. LOCATION: \"Where does it hurt?\"       Lower left side ovarian pain   2. RADIATION: \"Does the pain shoot anywhere else?\" (e.g., lower back, groin, thighs)      Radiating through her hip   3. ONSET: \"When did the pain begin?\" (e.g., minutes, hours or days ago)       Last week   4. SUDDEN: \"Gradual or sudden onset?\"      Gradual   5. PATTERN \"Does the pain come and go, or is it constant?\"     - If constant: \"Is it getting better, staying the same, or worsening?\"       (Note: Constant means the pain never goes away completely; most serious pain is constant and gets worse over time)      - If intermittent: \"How long does it last?\" \"Do you have pain now?\"      (Note: Intermittent means the pain goes away completely between bouts)      Constant-aching and dull pain   6. SEVERITY: \"How bad is the pain?\"  (e.g., Scale 1-10; mild, moderate, or severe)    - MILD (1-3): doesn't interfere with normal activities, area soft and not tender to touch     - MODERATE (4-7): interferes with normal activities or awakens from sleep, tender to touch     - SEVERE (8-10): excruciating pain, doubled over, unable to do any normal activities       4/10  7. RECURRENT SYMPTOM: \"Have you ever had this type of pelvic pain before?\" If Yes, ask: \"When was the last time?\" and \"What happened that time?\"       Denies   8. CAUSE: \"What do " "you think is causing the pelvic pain?\"      Had a hip xray as well   9. RELIEVING/AGGRAVATING FACTORS: \"What makes it better or worse?\" (e.g., activity/rest, sexual intercourse, voiding, passing stool)      Denies pain with movement.  The pain stays the ssame   10. OTHER SYMPTOMS: \"Has there been any other symptoms?\" (e.g., fever, vaginal bleeding, vaginal discharge, diarrhea, constipation, or voiding problems?\"        Denies   11. PREGNANCY: \"Is there any chance you are pregnant?\" \"When was your last menstrual period?\"        N/A    Protocols used: Pelvic Pain - Female-ADULT-[place]    "

## 2024-04-08 ENCOUNTER — HOSPITAL ENCOUNTER (OUTPATIENT)
Dept: MAMMOGRAPHY | Facility: MEDICAL CENTER | Age: 54
Discharge: HOME | End: 2024-04-08
Payer: COMMERCIAL

## 2024-04-08 VITALS — WEIGHT: 147 LBS | BODY MASS INDEX: 23.63 KG/M2 | HEIGHT: 66 IN

## 2024-04-08 DIAGNOSIS — Z12.31 SCREENING MAMMOGRAM FOR BREAST CANCER: ICD-10-CM

## 2024-04-08 PROCEDURE — 77063 BREAST TOMOSYNTHESIS BI: CPT

## 2024-04-08 PROCEDURE — 77067 SCR MAMMO BI INCL CAD: CPT

## 2024-10-04 ENCOUNTER — TELEPHONE (OUTPATIENT)
Age: 54
End: 2024-10-04

## 2024-10-04 ENCOUNTER — TELEPHONE (OUTPATIENT)
Dept: SURGICAL ONCOLOGY | Facility: CLINIC | Age: 54
End: 2024-10-04

## 2024-10-04 NOTE — TELEPHONE ENCOUNTER
Pt called and stated she is due for a mammo. A new order needs to be placed and also pt stated the office calls to schedule that she has apt with Estefanía 12/31/24

## 2024-10-04 NOTE — TELEPHONE ENCOUNTER
Called patient and left message to call back regarding upcoming appointment with Dr. Nj. Unfortunately, we need to reschedule this appointment since Dr. Nj will not be in the office. Patient can schedule with one of our nurse practitioners if she would prefer to be seen sooner.

## 2024-10-07 ENCOUNTER — TELEPHONE (OUTPATIENT)
Dept: SURGICAL ONCOLOGY | Facility: CLINIC | Age: 54
End: 2024-10-07

## 2024-10-07 NOTE — TELEPHONE ENCOUNTER
Left message for patient from MARCUS Silvestre regarding her imaging.  I will order her mammo when I see her in December.

## 2024-12-31 ENCOUNTER — TELEPHONE (OUTPATIENT)
Dept: HEMATOLOGY ONCOLOGY | Facility: CLINIC | Age: 54
End: 2024-12-31

## 2024-12-31 ENCOUNTER — OFFICE VISIT (OUTPATIENT)
Dept: SURGICAL ONCOLOGY | Facility: CLINIC | Age: 54
End: 2024-12-31
Payer: COMMERCIAL

## 2024-12-31 VITALS
SYSTOLIC BLOOD PRESSURE: 122 MMHG | TEMPERATURE: 97.2 F | DIASTOLIC BLOOD PRESSURE: 82 MMHG | HEIGHT: 66 IN | HEART RATE: 83 BPM | BODY MASS INDEX: 23.73 KG/M2 | OXYGEN SATURATION: 98 %

## 2024-12-31 DIAGNOSIS — Z12.31 BREAST CANCER SCREENING BY MAMMOGRAM: Primary | ICD-10-CM

## 2024-12-31 DIAGNOSIS — Z80.3 FAMILY HISTORY OF BREAST CANCER: ICD-10-CM

## 2024-12-31 PROCEDURE — 99213 OFFICE O/P EST LOW 20 MIN: CPT

## 2024-12-31 RX ORDER — MONTELUKAST SODIUM 10 MG/1
10 TABLET ORAL EVERY EVENING
COMMUNITY
Start: 2024-12-09

## 2024-12-31 NOTE — ASSESSMENT & PLAN NOTE
Ms. Lina Moreland is a 54 y.o. female presenting today for 1 year follow up due to her previously dense breast tissue. Pt does not have a personal hx of cancer, however her family hx is notable for breast cancer in her (2) maternal aunts (dx at age 55 and 60). Other cancer in the family includes melanoma in her mother (dx age 81), stomach cancer in her maternal aunt (dx age 72), and lung cancer in her maternal grandmother (dx age 72). No known genetic testing in the family. She continues enhanced breast cancer screening with annual mammogram. Her last bilateral mammogram was on 04/08/2024 which demonstrated BI-RADS 2, category 2 density (Fibroglandular tissue). Today I calculated her Tyrer-Cuzick (TC) lifetime risk of breast cancer. Results demonstrated a 11.4% lifetime risk (utilizing competing mortality). I advised Ms. Moreland that this reinforces adequate screening with annual mammograms. Given this risk score, she may continue to follow with GYN and/or PCP if she chooses not to follow with us annually. I discussed modifiable risk factors, including maintenance of a healthy weight, dietary changes, limiting alcohol intake, as well as smoking cessation. I also briefly discussed Helix testing with Ms. Moreland if she would like to further clarify breast cancer risk. She is agreeable. I encouraged her to follow up with GYN regarding new lower abdominal pain X2 months. There were no concerning findings upon clinical breast exam (CBE) today.  S/s of breast cancer were reviewed. I encouraged pt to promptly call if any questions or concerns prior to next scheduled appmt. I will see the patient back in 1 year, if she prefers, or sooner should the need arise. All questions were answered today.

## 2024-12-31 NOTE — PROGRESS NOTES
Name: Lina Moreland      : 1970      MRN: [id number]  Encounter Provider: JAIME Sosa  Encounter Date: 2024   Encounter department: [facility] [place]  :  Assessment & Plan  Breast cancer screening by mammogram  Orders:  •  Mammo screening bilateral w 3d and cad; Future    Family history of breast cancer  Ms. Lina Moreland is a 54 y.o. female presenting today for 1 year follow up due to her previously dense breast tissue. Pt does not have a personal hx of cancer, however her family hx is notable for breast cancer in her (2) maternal aunts (dx at age 55 and 60). Other cancer in the family includes melanoma in her mother (dx age 81), stomach cancer in her maternal aunt (dx age 72), and lung cancer in her maternal grandmother (dx age 72). No known genetic testing in the family. She continues enhanced breast cancer screening with annual mammogram. Her last bilateral mammogram was on 2024 which demonstrated BI-RADS 2, category 2 density (Fibroglandular tissue). Today I calculated her Tyrer-Cuzick (TC) lifetime risk of breast cancer. Results demonstrated a 11.4% lifetime risk (utilizing competing mortality). I advised Ms. Moreland that this reinforces adequate screening with annual mammograms. Given this risk score, she may continue to follow with GYN and/or PCP if she chooses not to follow with us annually. I discussed modifiable risk factors, including maintenance of a healthy weight, dietary changes, limiting alcohol intake, as well as smoking cessation. I also briefly discussed Helix testing with Ms. Moreland if she would like to further clarify breast cancer risk. She is agreeable. I encouraged her to follow up with GYN regarding new lower abdominal pain X2 months. There were no concerning findings upon clinical breast exam (CBE) today.  S/s of breast cancer were reviewed. I encouraged pt to promptly call if any questions or concerns prior to next  "scheduled appmt. I will see the patient back in 1 year, if she prefers, or sooner should the need arise. All questions were answered today.           History of Present Illness   Ms. Lina Moreland is a 54 y.o. female presenting today for 1 year follow up due to her previously dense breast tissue. Pt does not have a personal hx of cancer, however her family hx is notable for breast cancer in her (2) maternal aunts (dx at age 55 and 60). Other cancer in the family includes melanoma in her mother (dx age 81), stomach cancer in her maternal aunt (dx age 72), and lung cancer in her maternal grandmother (dx age 72). She continues enhanced breast cancer screening with annual mammogram. Her last bilateral mammogram was on  04/08/2024  which demonstrated BI-RADS 2, category 2 density (Fibroglandular tissue). She denies breast changes, palpable concerns, nipple discharge, as well as any changes in family history.  .        Review of Systems   Constitutional:  Negative for activity change, appetite change, fatigue, fever and unexpected weight change (pt reports menopause-associated weight gain).   Gastrointestinal:  Positive for abdominal pain (steady lower abd pain X2 months, pt suspects possible ovarian cyst and plans on contacting GYN).   Skin: Negative.    Neurological: Negative.    Psychiatric/Behavioral:  Negative for confusion.     A complete review of systems is negative other than that noted above in the HPI.         Objective   /82 (BP Location: Right arm, Patient Position: Sitting, Cuff Size: Standard)   Pulse 83   Temp (!) 97.2 °F (36.2 °C) (Temporal)   Ht 5' 6\" (1.676 m)   SpO2 98%   BMI 23.73 kg/m²       Physical Exam  Vitals and nursing note reviewed.   Constitutional:       Appearance: Normal appearance. She is normal weight.   Eyes:      Conjunctiva/sclera: Conjunctivae normal.   Chest:      Chest wall: No mass.   Breasts:     Right: No swelling, bleeding, inverted nipple, mass, nipple discharge, skin " change or tenderness.      Left: No swelling, bleeding, inverted nipple, mass, nipple discharge, skin change or tenderness.      Comments: No palpable masses, nodularities. No visible skin changes, nipple discharge, or additional concerns.  Prominent ribs bilaterally at medial aspect of chest wall  Lymphadenopathy:      Upper Body:      Right upper body: No supraclavicular or axillary adenopathy.      Left upper body: No supraclavicular or axillary adenopathy.   Skin:     General: Skin is warm and dry.   Neurological:      Mental Status: She is alert. Mental status is at baseline.   Psychiatric:         Mood and Affect: Mood normal.         Behavior: Behavior normal.         Thought Content: Thought content normal.         Judgment: Judgment normal.          Labs: I have reviewed pertinent labs.   No visits with results within 1 Month(s) from this visit.   Latest known visit with results is:   Office Visit on 06/06/2023   Component Date Value Ref Range Status   • Urine Culture 06/06/2023 80,000-89,000 cfu/ml Lactobacillus species (A)   Final   • LEUKOCYTE ESTERASE,UA 06/06/2023 1+   Final   • NITRITE,UA 06/06/2023 negative   Final   • SL AMB POCT UROBILINOGEN 06/06/2023 negative   Final   • POCT URINE PROTEIN 06/06/2023 trace   Final   •  PH,UA 06/06/2023 7   Final   • BLOOD,UA 06/06/2023 negative   Final   • SPECIFIC GRAVITY,UA 06/06/2023 1.010   Final   • KETONES,UA 06/06/2023 negative   Final   • BILIRUBIN,UA 06/06/2023 negative   Final   • GLUCOSE, UA 06/06/2023 negative   Final   •  COLOR,UA 06/06/2023 yellow   Final   • CLARITY,UA 06/06/2023 clear   Final